# Patient Record
Sex: MALE | Race: WHITE | NOT HISPANIC OR LATINO | Employment: OTHER | URBAN - METROPOLITAN AREA
[De-identification: names, ages, dates, MRNs, and addresses within clinical notes are randomized per-mention and may not be internally consistent; named-entity substitution may affect disease eponyms.]

---

## 2022-02-04 ENCOUNTER — TELEPHONE (OUTPATIENT)
Dept: SCHEDULING | Facility: IMAGING CENTER | Age: 77
End: 2022-02-04

## 2022-02-04 SDOH — ECONOMIC STABILITY: FOOD INSECURITY: WITHIN THE PAST 12 MONTHS, YOU WORRIED THAT YOUR FOOD WOULD RUN OUT BEFORE YOU GOT MONEY TO BUY MORE.: NEVER TRUE

## 2022-02-04 SDOH — HEALTH STABILITY: PHYSICAL HEALTH: ON AVERAGE, HOW MANY MINUTES DO YOU ENGAGE IN EXERCISE AT THIS LEVEL?: 20 MIN

## 2022-02-04 SDOH — HEALTH STABILITY: PHYSICAL HEALTH: ON AVERAGE, HOW MANY DAYS PER WEEK DO YOU ENGAGE IN MODERATE TO STRENUOUS EXERCISE (LIKE A BRISK WALK)?: 5 DAYS

## 2022-02-04 SDOH — ECONOMIC STABILITY: HOUSING INSECURITY
IN THE LAST 12 MONTHS, WAS THERE A TIME WHEN YOU DID NOT HAVE A STEADY PLACE TO SLEEP OR SLEPT IN A SHELTER (INCLUDING NOW)?: NO

## 2022-02-04 SDOH — ECONOMIC STABILITY: FOOD INSECURITY: WITHIN THE PAST 12 MONTHS, THE FOOD YOU BOUGHT JUST DIDN'T LAST AND YOU DIDN'T HAVE MONEY TO GET MORE.: NEVER TRUE

## 2022-02-04 SDOH — ECONOMIC STABILITY: INCOME INSECURITY: IN THE LAST 12 MONTHS, WAS THERE A TIME WHEN YOU WERE NOT ABLE TO PAY THE MORTGAGE OR RENT ON TIME?: NO

## 2022-02-04 SDOH — HEALTH STABILITY: MENTAL HEALTH
STRESS IS WHEN SOMEONE FEELS TENSE, NERVOUS, ANXIOUS, OR CAN'T SLEEP AT NIGHT BECAUSE THEIR MIND IS TROUBLED. HOW STRESSED ARE YOU?: NOT AT ALL

## 2022-02-04 SDOH — ECONOMIC STABILITY: TRANSPORTATION INSECURITY
IN THE PAST 12 MONTHS, HAS THE LACK OF TRANSPORTATION KEPT YOU FROM MEDICAL APPOINTMENTS OR FROM GETTING MEDICATIONS?: NO

## 2022-02-04 SDOH — ECONOMIC STABILITY: TRANSPORTATION INSECURITY
IN THE PAST 12 MONTHS, HAS LACK OF TRANSPORTATION KEPT YOU FROM MEETINGS, WORK, OR FROM GETTING THINGS NEEDED FOR DAILY LIVING?: NO

## 2022-02-04 SDOH — ECONOMIC STABILITY: TRANSPORTATION INSECURITY
IN THE PAST 12 MONTHS, HAS LACK OF RELIABLE TRANSPORTATION KEPT YOU FROM MEDICAL APPOINTMENTS, MEETINGS, WORK OR FROM GETTING THINGS NEEDED FOR DAILY LIVING?: NO

## 2022-02-04 SDOH — ECONOMIC STABILITY: INCOME INSECURITY: HOW HARD IS IT FOR YOU TO PAY FOR THE VERY BASICS LIKE FOOD, HOUSING, MEDICAL CARE, AND HEATING?: NOT HARD AT ALL

## 2022-02-04 SDOH — ECONOMIC STABILITY: HOUSING INSECURITY

## 2022-02-04 ASSESSMENT — LIFESTYLE VARIABLES
HOW OFTEN DO YOU HAVE A DRINK CONTAINING ALCOHOL: 4 OR MORE TIMES A WEEK
HOW OFTEN DO YOU HAVE SIX OR MORE DRINKS ON ONE OCCASION: NEVER
HOW MANY STANDARD DRINKS CONTAINING ALCOHOL DO YOU HAVE ON A TYPICAL DAY: 1 OR 2

## 2022-02-04 ASSESSMENT — SOCIAL DETERMINANTS OF HEALTH (SDOH)
IN A TYPICAL WEEK, HOW MANY TIMES DO YOU TALK ON THE PHONE WITH FAMILY, FRIENDS, OR NEIGHBORS?: ONCE A WEEK
HOW OFTEN DO YOU ATTEND CHURCH OR RELIGIOUS SERVICES?: NEVER
HOW HARD IS IT FOR YOU TO PAY FOR THE VERY BASICS LIKE FOOD, HOUSING, MEDICAL CARE, AND HEATING?: NOT HARD AT ALL
DO YOU BELONG TO ANY CLUBS OR ORGANIZATIONS SUCH AS CHURCH GROUPS UNIONS, FRATERNAL OR ATHLETIC GROUPS, OR SCHOOL GROUPS?: YES
HOW OFTEN DO YOU ATTENT MEETINGS OF THE CLUB OR ORGANIZATION YOU BELONG TO?: MORE THAN 4 TIMES PER YEAR
HOW OFTEN DO YOU HAVE SIX OR MORE DRINKS ON ONE OCCASION: NEVER
HOW OFTEN DO YOU HAVE A DRINK CONTAINING ALCOHOL: 4 OR MORE TIMES A WEEK
HOW OFTEN DO YOU GET TOGETHER WITH FRIENDS OR RELATIVES?: ONCE A WEEK
HOW MANY DRINKS CONTAINING ALCOHOL DO YOU HAVE ON A TYPICAL DAY WHEN YOU ARE DRINKING: 1 OR 2
HOW OFTEN DO YOU ATTENT MEETINGS OF THE CLUB OR ORGANIZATION YOU BELONG TO?: MORE THAN 4 TIMES PER YEAR
DO YOU BELONG TO ANY CLUBS OR ORGANIZATIONS SUCH AS CHURCH GROUPS UNIONS, FRATERNAL OR ATHLETIC GROUPS, OR SCHOOL GROUPS?: YES
HOW OFTEN DO YOU ATTEND CHURCH OR RELIGIOUS SERVICES?: NEVER
WITHIN THE PAST 12 MONTHS, YOU WORRIED THAT YOUR FOOD WOULD RUN OUT BEFORE YOU GOT THE MONEY TO BUY MORE: NEVER TRUE
HOW OFTEN DO YOU GET TOGETHER WITH FRIENDS OR RELATIVES?: ONCE A WEEK
IN A TYPICAL WEEK, HOW MANY TIMES DO YOU TALK ON THE PHONE WITH FAMILY, FRIENDS, OR NEIGHBORS?: ONCE A WEEK

## 2022-02-07 ENCOUNTER — OFFICE VISIT (OUTPATIENT)
Dept: INTERNAL MEDICINE | Facility: OTHER | Age: 77
End: 2022-02-07
Payer: MEDICARE

## 2022-02-07 VITALS
SYSTOLIC BLOOD PRESSURE: 160 MMHG | TEMPERATURE: 97.2 F | WEIGHT: 208.8 LBS | DIASTOLIC BLOOD PRESSURE: 85 MMHG | OXYGEN SATURATION: 95 % | HEIGHT: 73 IN | BODY MASS INDEX: 27.67 KG/M2 | HEART RATE: 60 BPM

## 2022-02-07 DIAGNOSIS — Z90.79 H/O PROSTATECTOMY: ICD-10-CM

## 2022-02-07 DIAGNOSIS — Z00.00 HEALTH CARE MAINTENANCE: ICD-10-CM

## 2022-02-07 DIAGNOSIS — R41.3 MEMORY LOSS: ICD-10-CM

## 2022-02-07 DIAGNOSIS — N52.9 ERECTILE DYSFUNCTION, UNSPECIFIED ERECTILE DYSFUNCTION TYPE: ICD-10-CM

## 2022-02-07 DIAGNOSIS — R73.03 PRE-DIABETES: ICD-10-CM

## 2022-02-07 DIAGNOSIS — E55.9 VITAMIN D DEFICIENCY: ICD-10-CM

## 2022-02-07 DIAGNOSIS — E78.5 DYSLIPIDEMIA: ICD-10-CM

## 2022-02-07 DIAGNOSIS — R03.0 ELEVATED BLOOD PRESSURE READING: ICD-10-CM

## 2022-02-07 PROCEDURE — 99214 OFFICE O/P EST MOD 30 MIN: CPT | Mod: GC | Performed by: STUDENT IN AN ORGANIZED HEALTH CARE EDUCATION/TRAINING PROGRAM

## 2022-02-07 RX ORDER — ATORVASTATIN CALCIUM 20 MG/1
40 TABLET, FILM COATED ORAL DAILY
COMMUNITY
Start: 2022-02-04 | End: 2022-03-17 | Stop reason: SDUPTHER

## 2022-02-07 ASSESSMENT — PATIENT HEALTH QUESTIONNAIRE - PHQ9: CLINICAL INTERPRETATION OF PHQ2 SCORE: 0

## 2022-02-07 NOTE — PROGRESS NOTES
Established Patient    Chief Complaint   Patient presents with   • New Patient   • Memory Loss       HPI: Patricio Huffman is a 76 y.o. male presented to the clinic to establish care.    He moved from [place] to [place] about 2 years ago and lives in [place] but was following with his previous PCP via telemedicine and would like to establish with provider in [place].     Dyslipidemia - Currently on Atorvastatin 20mg for primary prevention. Labs from 5/2021 showing TC-188, TG-88, HDL-46 and LDL-124. Reports having high cholesterol since age 36. Denies any history of MI, Stroke or PVD. Denies any myalgias 2/2 to statin therapy.    Pre-diabetes/Diabetes - Unclear history but informs his previous PCP has started Metformin 500mg once daily about a year ago and that it would also help with weight loss. Last A1c from his previous labs was 5.4% at 10/2020. Denies any neuropathy symptoms.    Elevated blood pressure - Reports getting anxious while in medical setting since age of 21's. Check blood pressure once every 3 months with readings about 130's/80's. Denies any symptoms of chest pain, shortness of breath, headache, palpitations.    Prostate Cancer - Underwent prostatectomy around 12 years ago and following with Dr. Oconnor(urology) from [place]. Did not receive any chemo/radiation. He would like to continue to follow with same urologist.    Erectile dysfunction - Currently taking Trimex, self administered as needed prescribed by his urologist.        Patient Active Problem List    Diagnosis Date Noted   • Dyslipidemia 02/09/2022   • H/O prostatectomy 02/09/2022   • Elevated blood pressure reading 02/09/2022   • Erectile dysfunction 02/09/2022   • Pre-diabetes 02/09/2022   • Memory loss 02/09/2022   • Health care maintenance 02/09/2022       Current Outpatient Medications   Medication Sig Dispense Refill   • atorvastatin (LIPITOR) 20 MG Tab Take 20 mg by mouth every day.     • metFORMIN (GLUCOPHAGE) 500 MG  "Tab Take 500 mg by mouth every day.       No current facility-administered medications for this visit.       ROS: As per HPI. Additional pertinent systems as noted below.  Constitutional:  Negative for fever, chills   HENT:  Negative for ear pain, sore throat, runny nose   Eyes:  Negative for blurred vision and double vision   Cardiovascular:  Negative for chest pain, palpitations   Respiratory:  Negative for cough, sputum production, shortness of breath   Gastrointestinal: Negative for abdominal pain, nausea, vomiting, diarrhea, or blood in stools   Genitourinary: Negative for dysuria, flank pain and hematuria  Skin: Negative for rash, itching  Extremities: Negative for leg swelling     /85 (BP Location: Right arm, Patient Position: Sitting, BP Cuff Size: Adult)   Pulse 60   Temp 36.2 °C (97.2 °F) (Temporal)   Ht 1.854 m (6' 1\")   Wt 94.7 kg (208 lb 12.8 oz)   SpO2 95%   BMI 27.55 kg/m²     Physical Exam   Constitutional:  Well developed, well nourished. Not in acute distress   HENT:  Normocephalic, Atraumatic, Oropharynx is pink and moist. No oral exudates, Nose normal   Eyes:  EOMI, Conjunctiva normal, No discharge. PERRLA   Neck:  Normal range of motion, No cervical lymphadenopathy. No thyromegaly.   Cardiovascular:  Regular rate and rhythm, No pedal edema, Intact distal pulses   Respiratory: Clear to auscultation bilaterally, No use of accessory muscles   Gastrointestinal: Bowel sounds normal, Soft, No tenderness, No palpable masses/hepatosplenomegaly   Musculoskeletal: No cyanosis or clubbing, normal ROM     Note: I have reviewed all pertinent labs and diagnostic tests associated with this visit with specific comments listed under the assessment and plan below    Assessment and Plan  H/O prostatectomy  Underwent prostatectomy around 12 years ago and following with Dr. Oconnor(urology) from [place]. Did not receive any chemo/radiation. He would like to continue to follow with same " urologist.  Plan:  - Continue urology followup  - Check PSA annually for surveillance as determined by urology      Elevated blood pressure reading  Reports getting anxious while in medical setting since age of 21's. Check blood pressure once every 3 months with readings about 130's/80's.   BP today in clinic - 160/85  Plan:  - Ambulatory blood pressure monitoring to rule out white coat hypertension  - DASH diet/exericise and weight loss  - Followup in 4 weeks with BP log along with BP machine    Erectile dysfunction  Currently taking Trimex, self administered as needed prescribed by his urologist    Pre-diabetes  Unclear history but informs his previous PCP has started Metformin 500mg once daily about a year ago and that it would also help with weight loss. Last A1c from his previous labs was 5.4% at 10/2020. Denies any neuropathy symptoms.  Plan:  - Check A1c and determine if he needs to continue Metformin based on lab results at next visit      Memory loss  Self reported complaints of problems with memory once in a while  Difficulty with short term memory and in general has problem with names/numbers  Independent with ADL's/IADL's  MOCA score of 29 with a point missed for delayed recall  Plan:  - Low suspicion for any ongoing process such as dementia and believe it to be sec. to aging  -  Check for reversible risk factors such as B12 and TSH  - Hold off on any imaging such as MRI brain at this time or neurology referral  - Follow up at next visit    Health care maintenance  Received 3 doses of Moderna vaccine  Colonoscopy - Informs getting it done about 7 years with normal result  Shingles - 2 years ago  PPSV 23 - 2 years ago  Plan:  - Followup on Tdap and Flu vaccine at next visit        Followup: No follow-ups on file.    Patient seen with attending.    Signed by: Hattie Hsu M.D.    Please note that this dictation was created using voice recognition software. I have made every reasonable attempt to  correct obvious errors, but I expect that there are errors of grammar and possibly content that I did not discover before finalizing the note.

## 2022-02-07 NOTE — PATIENT INSTRUCTIONS
"Please get blood work printed prior to your next appointment    Elevated Blood pressure - Please exercise, watch your diet, Maintain BP log and bring BP machine to your next visit.    Follow up in 4-6 weeks with me      DASH Eating Plan  DASH stands for \"Dietary Approaches to Stop Hypertension.\" The DASH eating plan is a healthy eating plan that has been shown to reduce high blood pressure (hypertension). It may also reduce your risk for type 2 diabetes, heart disease, and stroke. The DASH eating plan may also help with weight loss.  What are tips for following this plan?    General guidelines  · Avoid eating more than 2,300 mg (milligrams) of salt (sodium) a day. If you have hypertension, you may need to reduce your sodium intake to 1,500 mg a day.  · Limit alcohol intake to no more than 1 drink a day for nonpregnant women and 2 drinks a day for men. One drink equals 12 oz of beer, 5 oz of wine, or 1½ oz of hard liquor.  · Work with your health care provider to maintain a healthy body weight or to lose weight. Ask what an ideal weight is for you.  · Get at least 30 minutes of exercise that causes your heart to beat faster (aerobic exercise) most days of the week. Activities may include walking, swimming, or biking.  · Work with your health care provider or diet and nutrition specialist (dietitian) to adjust your eating plan to your individual calorie needs.  Reading food labels    · Check food labels for the amount of sodium per serving. Choose foods with less than 5 percent of the Daily Value of sodium. Generally, foods with less than 300 mg of sodium per serving fit into this eating plan.  · To find whole grains, look for the word \"whole\" as the first word in the ingredient list.  Shopping  · Buy products labeled as \"low-sodium\" or \"no salt added.\"  · Buy fresh foods. Avoid canned foods and premade or frozen meals.  Cooking  · Avoid adding salt when cooking. Use salt-free seasonings or herbs instead of table salt " or sea salt. Check with your health care provider or pharmacist before using salt substitutes.  · Do not sousa foods. Cook foods using healthy methods such as baking, boiling, grilling, and broiling instead.  · Cook with heart-healthy oils, such as olive, canola, soybean, or sunflower oil.  Meal planning  · Eat a balanced diet that includes:  ? 5 or more servings of fruits and vegetables each day. At each meal, try to fill half of your plate with fruits and vegetables.  ? Up to 6-8 servings of whole grains each day.  ? Less than 6 oz of lean meat, poultry, or fish each day. A 3-oz serving of meat is about the same size as a deck of cards. One egg equals 1 oz.  ? 2 servings of low-fat dairy each day.  ? A serving of nuts, seeds, or beans 5 times each week.  ? Heart-healthy fats. Healthy fats called Omega-3 fatty acids are found in foods such as flaxseeds and coldwater fish, like sardines, salmon, and mackerel.  · Limit how much you eat of the following:  ? Canned or prepackaged foods.  ? Food that is high in trans fat, such as fried foods.  ? Food that is high in saturated fat, such as fatty meat.  ? Sweets, desserts, sugary drinks, and other foods with added sugar.  ? Full-fat dairy products.  · Do not salt foods before eating.  · Try to eat at least 2 vegetarian meals each week.  · Eat more home-cooked food and less restaurant, buffet, and fast food.  · When eating at a restaurant, ask that your food be prepared with less salt or no salt, if possible.  What foods are recommended?  The items listed may not be a complete list. Talk with your dietitian about what dietary choices are best for you.  Grains  Whole-grain or whole-wheat bread. Whole-grain or whole-wheat pasta. Brown rice. Oatmeal. Quinoa. Bulgur. Whole-grain and low-sodium cereals. Antoinette bread. Low-fat, low-sodium crackers. Whole-wheat flour tortillas.  Vegetables  Fresh or frozen vegetables (raw, steamed, roasted, or grilled). Low-sodium or reduced-sodium  tomato and vegetable juice. Low-sodium or reduced-sodium tomato sauce and tomato paste. Low-sodium or reduced-sodium canned vegetables.  Fruits  All fresh, dried, or frozen fruit. Canned fruit in natural juice (without added sugar).  Meat and other protein foods  Skinless chicken or turkey. Ground chicken or turkey. Pork with fat trimmed off. Fish and seafood. Egg whites. Dried beans, peas, or lentils. Unsalted nuts, nut butters, and seeds. Unsalted canned beans. Lean cuts of beef with fat trimmed off. Low-sodium, lean deli meat.  Dairy  Low-fat (1%) or fat-free (skim) milk. Fat-free, low-fat, or reduced-fat cheeses. Nonfat, low-sodium ricotta or cottage cheese. Low-fat or nonfat yogurt. Low-fat, low-sodium cheese.  Fats and oils  Soft margarine without trans fats. Vegetable oil. Low-fat, reduced-fat, or light mayonnaise and salad dressings (reduced-sodium). Canola, safflower, olive, soybean, and sunflower oils. Avocado.  Seasoning and other foods  Herbs. Spices. Seasoning mixes without salt. Unsalted popcorn and pretzels. Fat-free sweets.  What foods are not recommended?  The items listed may not be a complete list. Talk with your dietitian about what dietary choices are best for you.  Grains  Baked goods made with fat, such as croissants, muffins, or some breads. Dry pasta or rice meal packs.  Vegetables  Creamed or fried vegetables. Vegetables in a cheese sauce. Regular canned vegetables (not low-sodium or reduced-sodium). Regular canned tomato sauce and paste (not low-sodium or reduced-sodium). Regular tomato and vegetable juice (not low-sodium or reduced-sodium). Pickles. Olives.  Fruits  Canned fruit in a light or heavy syrup. Fried fruit. Fruit in cream or butter sauce.  Meat and other protein foods  Fatty cuts of meat. Ribs. Fried meat. Medrano. Sausage. Bologna and other processed lunch meats. Salami. Fatback. Hotdogs. Bratwurst. Salted nuts and seeds. Canned beans with added salt. Canned or smoked fish.  Whole eggs or egg yolks. Chicken or turkey with skin.  Dairy  Whole or 2% milk, cream, and half-and-half. Whole or full-fat cream cheese. Whole-fat or sweetened yogurt. Full-fat cheese. Nondairy creamers. Whipped toppings. Processed cheese and cheese spreads.  Fats and oils  Butter. Stick margarine. Lard. Shortening. Ghee. Medrano fat. Tropical oils, such as coconut, palm kernel, or palm oil.  Seasoning and other foods  Salted popcorn and pretzels. Onion salt, garlic salt, seasoned salt, table salt, and sea salt. Worcestershire sauce. Tartar sauce. Barbecue sauce. Teriyaki sauce. Soy sauce, including reduced-sodium. Steak sauce. Canned and packaged gravies. Fish sauce. Oyster sauce. Cocktail sauce. Horseradish that you find on the shelf. Ketchup. Mustard. Meat flavorings and tenderizers. Bouillon cubes. Hot sauce and Tabasco sauce. Premade or packaged marinades. Premade or packaged taco seasonings. Relishes. Regular salad dressings.  Where to find more information:  · National Heart, Lung, and Blood [place]: www.nhlbi.nih.gov  · American Heart Association: www.heart.org  Summary  · The DASH eating plan is a healthy eating plan that has been shown to reduce high blood pressure (hypertension). It may also reduce your risk for type 2 diabetes, heart disease, and stroke.  · With the DASH eating plan, you should limit salt (sodium) intake to 2,300 mg a day. If you have hypertension, you may need to reduce your sodium intake to 1,500 mg a day.  · When on the DASH eating plan, aim to eat more fresh fruits and vegetables, whole grains, lean proteins, low-fat dairy, and heart-healthy fats.  · Work with your health care provider or diet and nutrition specialist (dietitian) to adjust your eating plan to your individual calorie needs.  This information is not intended to replace advice given to you by your health care provider. Make sure you discuss any questions you have with your health care provider.  Document Released:  12/06/2012 Document Revised: 11/30/2018 Document Reviewed: 12/11/2017  Elsevier Patient Education © 2020 [employer] Inc.

## 2022-02-09 PROBLEM — R41.3 MEMORY LOSS: Status: ACTIVE | Noted: 2022-02-09

## 2022-02-09 PROBLEM — R03.0 ELEVATED BLOOD PRESSURE READING: Status: ACTIVE | Noted: 2022-02-09

## 2022-02-09 PROBLEM — E78.5 DYSLIPIDEMIA: Status: ACTIVE | Noted: 2022-02-09

## 2022-02-09 PROBLEM — N52.9 ERECTILE DYSFUNCTION: Status: ACTIVE | Noted: 2022-02-09

## 2022-02-09 PROBLEM — Z90.79 H/O PROSTATECTOMY: Status: ACTIVE | Noted: 2022-02-09

## 2022-02-09 PROBLEM — Z00.00 HEALTH CARE MAINTENANCE: Status: ACTIVE | Noted: 2022-02-09

## 2022-02-09 PROBLEM — R73.03 PRE-DIABETES: Status: ACTIVE | Noted: 2022-02-09

## 2022-02-10 NOTE — ASSESSMENT & PLAN NOTE
Reports getting anxious while in medical setting since age of 21's. Check blood pressure once every 3 months with readings about 130's/80's.   BP today in clinic - 160/85  Plan:  - Ambulatory blood pressure monitoring to rule out white coat hypertension  - DASH diet/exericise and weight loss  - Followup in 4 weeks with BP log along with BP machine

## 2022-02-10 NOTE — ASSESSMENT & PLAN NOTE
Unclear history but informs his previous PCP has started Metformin 500mg once daily about a year ago and that it would also help with weight loss. Last A1c from his previous labs was 5.4% at 10/2020. Denies any neuropathy symptoms.  Plan:  - Check A1c and determine if he needs to continue Metformin based on lab results at next visit

## 2022-02-10 NOTE — ASSESSMENT & PLAN NOTE
Received 3 doses of Moderna vaccine  Colonoscopy - Informs getting it done about 7 years with normal result  Shingles - 2 years ago  PPSV 23 - 2 years ago  Plan:  - Followup on Tdap and Flu vaccine at next visit

## 2022-02-10 NOTE — ASSESSMENT & PLAN NOTE
Self reported complaints of problems with memory once in a while  Difficulty with short term memory and in general has problem with names/numbers  Independent with ADL's/IADL's  MOCA score of 29 with a point missed for delayed recall  Plan:  - Low suspicion for any ongoing process such as dementia and believe it to be sec. to aging  -  Check for reversible risk factors such as B12 and TSH  - Hold off on any imaging such as MRI brain at this time or neurology referral  - Follow up at next visit

## 2022-02-10 NOTE — ASSESSMENT & PLAN NOTE
Underwent prostatectomy around 12 years ago and following with Dr. Oconnor(urology) from [place]. Did not receive any chemo/radiation. He would like to continue to follow with same urologist.  Plan:  - Continue urology followup  - Check PSA annually for surveillance as determined by urology

## 2022-03-17 ENCOUNTER — OFFICE VISIT (OUTPATIENT)
Dept: INTERNAL MEDICINE | Facility: OTHER | Age: 77
End: 2022-03-17
Payer: MEDICARE

## 2022-03-17 VITALS
HEIGHT: 73 IN | SYSTOLIC BLOOD PRESSURE: 142 MMHG | TEMPERATURE: 98.1 F | DIASTOLIC BLOOD PRESSURE: 77 MMHG | HEART RATE: 68 BPM | WEIGHT: 200 LBS | BODY MASS INDEX: 26.51 KG/M2 | OXYGEN SATURATION: 95 %

## 2022-03-17 DIAGNOSIS — E78.5 DYSLIPIDEMIA: ICD-10-CM

## 2022-03-17 DIAGNOSIS — Z00.00 HEALTH CARE MAINTENANCE: ICD-10-CM

## 2022-03-17 DIAGNOSIS — R73.03 PRE-DIABETES: ICD-10-CM

## 2022-03-17 DIAGNOSIS — R03.0 ELEVATED BLOOD PRESSURE READING: ICD-10-CM

## 2022-03-17 LAB
HBA1C MFR BLD: 5.5 % (ref 0–5.6)
INT CON NEG: NEGATIVE
INT CON POS: POSITIVE

## 2022-03-17 PROCEDURE — 99213 OFFICE O/P EST LOW 20 MIN: CPT | Mod: GE | Performed by: STUDENT IN AN ORGANIZED HEALTH CARE EDUCATION/TRAINING PROGRAM

## 2022-03-17 PROCEDURE — 83036 HEMOGLOBIN GLYCOSYLATED A1C: CPT | Mod: GC | Performed by: STUDENT IN AN ORGANIZED HEALTH CARE EDUCATION/TRAINING PROGRAM

## 2022-03-17 RX ORDER — ATORVASTATIN CALCIUM 20 MG/1
40 TABLET, FILM COATED ORAL DAILY
Qty: 180 TABLET | Refills: 1 | Status: SHIPPED | OUTPATIENT
Start: 2022-03-17 | End: 2022-03-24 | Stop reason: SDUPTHER

## 2022-03-17 NOTE — PATIENT INSTRUCTIONS
Please stop taking Metformin.    Elevated blood pressure - We will continue to monitor your blood pressure and hold off on starting any medications. Believe it is secondary to white coat hypertension.    Dyslipidemia - We will go up on the dosage of atorvastatin to 40mg    Stomach discomfort - Try Tums over the counter.    Get Tdap(tetanus) vaccine from the pharmacy    Re-establish in July

## 2022-03-17 NOTE — PROGRESS NOTES
Established Patient    Chief Complaint   Patient presents with   • Hyperlipidemia     Follow up, labs       HPI: Patricio Huffman is a 77 y.o. male with past medical history significant for Dyslipidemia, pre-diabetes, elevated blood pressure readings, history of prostatectomy presents to the clinic for follow up visit.      Elevated Blood pressure - During last clinic visit his BP was 160/85 and he was asked to maintain BP log which showed home BP readings of 120-130/70's. He reports history of white coat hypertension, getting anxious in the presence of medical provider. He was never on any blood pressure medications before. BP today in the clinic at 142/77. Denies any chest pain, shortness of breath, headache or vision changes.    He had lab work done in 2/2022 which showed normal TSH/B12/Vitamin D and PSA of zero. Lipid panel was significant for TC-184, TG- 140, HDl-47 and LDL - 109. He is currently on Atorvastatin 20mg for primary revention and tolerating it well without any side affects such as myalgias. No known prior history of stroke, MI or PVD.      Patient Active Problem List    Diagnosis Date Noted   • Dyslipidemia 02/09/2022   • H/O prostatectomy 02/09/2022   • Elevated blood pressure reading 02/09/2022   • Erectile dysfunction 02/09/2022   • Pre-diabetes 02/09/2022   • Memory loss 02/09/2022   • Health care maintenance 02/09/2022       Current Outpatient Medications   Medication Sig Dispense Refill   • atorvastatin (LIPITOR) 20 MG Tab Take 2 Tablets by mouth every day. 180 Tablet 1     No current facility-administered medications for this visit.       ROS: As per HPI. Additional pertinent systems as noted below.  Constitutional:  Negative for fever, chills   HENT:  Negative for ear pain, sore throat, runny nose   Eyes:  Negative for blurred vision and double vision   Cardiovascular:  Negative for chest pain, palpitations   Respiratory:  Negative for cough, sputum production, shortness of breath  "  Gastrointestinal: Negative for abdominal pain, nausea, vomiting, diarrhea, or blood in stools   Genitourinary: Negative for dysuria, flank pain and hematuria  Skin: Negative for rash, itching  Extremities: Negative for leg swelling       /77 (BP Location: Left arm, Patient Position: Sitting, BP Cuff Size: Adult)   Pulse 68   Temp 36.7 °C (98.1 °F) (Temporal)   Ht 1.854 m (6' 1\")   Wt 90.7 kg (200 lb)   SpO2 95%   BMI 26.39 kg/m²     Physical Exam   Constitutional:  Well developed, well nourished. Not in acute distress   HENT:  Normocephalic, Atraumatic, Oropharynx is pink and moist. No oral exudates, Nose normal   Eyes:  EOMI, Conjunctiva normal, No discharge. PERRLA   Neck:  Normal range of motion, No cervical lymphadenopathy. No thyromegaly.   Cardiovascular:  Regular rate and rhythm, No pedal edema, Intact distal pulses   Respiratory: Clear to auscultation bilaterally, No use of accessory muscles   Gastrointestinal: Bowel sounds normal, Soft, No tenderness, No palpable masses/hepatosplenomegaly       Note: I have reviewed all pertinent labs and diagnostic tests associated with this visit with specific comments listed under the assessment and plan below    Assessment and Plan  Elevated blood pressure reading  BP improved in clinic compared to last visit at 142/77. At previous visit it was 160/85.  Home BP log showing readings at 120-130/70's  Elevated BP readings likely secondary to underlying component of white coat hypertension  Plan:  - Continue to monitor BP for now and hold off on initiating any medications  - DASH diet, exercise and weight loss      Dyslipidemia  Lipid panel showing  TC-184, TG- 140, HDl-47 and LDL - 109.  Currently on atorvastatin 20mg for primary prevention and tolerating well without side affects  ASCVD risk score of 16.8%  Plan:  - Increase atorvastatin to 40mg    Pre-diabetes  A1c done today in clinic at 5.5%  Prior Last known A1c of 5.4% from 10/2020  Informs being " started on metformin 500mg once daily by his previous PCP for ?pre-diabetes or to help with weight loss  Plan:  - Based on previous two labs, he doesn't warrant treatment with metformin and also cannot be classified as a pre-diabetic    Health care maintenance  Received 3 doses of Moderna vaccine  Colonoscopy - Informs getting it done about 7 years ago with normal result  Shingles - 2 years ago  PPSV 23 - 2 years ago  Flu shot - 11/2021  Last Tetanus shot unclear, advised to get Tdap vaccine from nearby pharmacy      Followup: No follow-ups on file.    Patient discussed with attending.    Signed by: Hattie Hsu M.D.    Please note that this dictation was created using voice recognition software. I have made every reasonable attempt to correct obvious errors, but I expect that there are errors of grammar and possibly content that I did not discover before finalizing the note.

## 2022-03-20 PROBLEM — R73.03 PRE-DIABETES: Status: RESOLVED | Noted: 2022-02-09 | Resolved: 2022-03-20

## 2022-03-21 NOTE — ASSESSMENT & PLAN NOTE
BP improved in clinic compared to last visit at 142/77. At previous visit it was 160/85.  Home BP log showing readings at 120-130/70's  Elevated BP readings likely secondary to underlying component of white coat hypertension  Plan:  - Continue to monitor BP for now and hold off on initiating any medications  - DASH diet, exercise and weight loss

## 2022-03-21 NOTE — ASSESSMENT & PLAN NOTE
Received 3 doses of Moderna vaccine  Colonoscopy - Informs getting it done about 7 years ago with normal result  Shingles - 2 years ago  PPSV 23 - 2 years ago  Flu shot - 11/2021  Last Tetanus shot unclear, advised to get Tdap vaccine from nearby pharmacy

## 2022-03-21 NOTE — ASSESSMENT & PLAN NOTE
A1c done today in clinic at 5.5%  Prior Last known A1c of 5.4% from 10/2020  Informs being started on metformin 500mg once daily by his previous PCP for ?pre-diabetes or to help with weight loss  Plan:  - Based on previous two labs, he doesn't warrant treatment with metformin and also cannot be classified as a pre-diabetic

## 2022-03-21 NOTE — ASSESSMENT & PLAN NOTE
Lipid panel showing  TC-184, TG- 140, HDl-47 and LDL - 109.  Currently on atorvastatin 20mg for primary prevention and tolerating well without side affects  ASCVD risk score of 16.8%  Plan:  - Increase atorvastatin to 40mg

## 2022-03-24 RX ORDER — ATORVASTATIN CALCIUM 40 MG/1
40 TABLET, FILM COATED ORAL DAILY
Qty: 90 TABLET | Refills: 1 | Status: SHIPPED
Start: 2022-03-24 | End: 2022-11-25

## 2022-03-24 NOTE — TELEPHONE ENCOUNTER
Tried calling patient to let him know prescription has been filled, but no response and unable to leave a message. Patient notified via [employer]

## 2022-03-24 NOTE — TELEPHONE ENCOUNTER
Last seen: 3/17/22 by Dr. Hsu Next appt: None      Does patient have an active prescription for medications requested? No    Received Request Via: Patient's insurance will not cover express script. Patient requesting to have prescription sent to [facility]

## 2022-10-11 PROBLEM — Z00.00 HEALTH CARE MAINTENANCE: Status: RESOLVED | Noted: 2022-02-09 | Resolved: 2022-10-11

## 2022-10-14 ENCOUNTER — OFFICE VISIT (OUTPATIENT)
Dept: INTERNAL MEDICINE | Facility: OTHER | Age: 77
End: 2022-10-14
Payer: MEDICARE

## 2022-10-14 VITALS
TEMPERATURE: 97.5 F | OXYGEN SATURATION: 97 % | BODY MASS INDEX: 26.48 KG/M2 | HEIGHT: 73 IN | WEIGHT: 199.8 LBS | DIASTOLIC BLOOD PRESSURE: 70 MMHG | HEART RATE: 68 BPM | SYSTOLIC BLOOD PRESSURE: 140 MMHG

## 2022-10-14 DIAGNOSIS — R25.2 MUSCLE CRAMPS: ICD-10-CM

## 2022-10-14 DIAGNOSIS — R03.0 ELEVATED BLOOD PRESSURE READING: ICD-10-CM

## 2022-10-14 DIAGNOSIS — Z23 ENCOUNTER FOR IMMUNIZATION: ICD-10-CM

## 2022-10-14 DIAGNOSIS — E78.5 DYSLIPIDEMIA: ICD-10-CM

## 2022-10-14 DIAGNOSIS — R01.1 SYSTOLIC MURMUR: ICD-10-CM

## 2022-10-14 PROCEDURE — G0008 ADMIN INFLUENZA VIRUS VAC: HCPCS | Performed by: GENERAL PRACTICE

## 2022-10-14 PROCEDURE — 90662 IIV NO PRSV INCREASED AG IM: CPT | Performed by: GENERAL PRACTICE

## 2022-10-14 PROCEDURE — 99214 OFFICE O/P EST MOD 30 MIN: CPT | Mod: GC | Performed by: GENERAL PRACTICE

## 2022-10-14 ASSESSMENT — ENCOUNTER SYMPTOMS
WHEEZING: 0
PALPITATIONS: 0
HEADACHES: 0
DIARRHEA: 0
HEARTBURN: 0
DEPRESSION: 0
DOUBLE VISION: 0
FALLS: 0
ABDOMINAL PAIN: 0
FEVER: 0
COUGH: 0
CHILLS: 0
NAUSEA: 0
NERVOUS/ANXIOUS: 0
SHORTNESS OF BREATH: 0
WEIGHT LOSS: 0
SORE THROAT: 0
WEAKNESS: 0
DIZZINESS: 0
VOMITING: 0
BLURRED VISION: 0
CONSTIPATION: 0
MYALGIAS: 1

## 2022-10-14 ASSESSMENT — LIFESTYLE VARIABLES: SUBSTANCE_ABUSE: 0

## 2022-10-14 NOTE — PATIENT INSTRUCTIONS
-call [phone] and press option 2 to schedule Echocardiogram for the heart murmur  -get labs to check chemistry (magnesium,calcium, sodium) for leg cramps, TSH  -hold lipitor for 2 weeks and see if leg cramps improve  -check blood pressure daily and record in a log and bring to next appointment  -follow up in 5 weeks

## 2022-11-04 ENCOUNTER — PATIENT MESSAGE (OUTPATIENT)
Dept: HEALTH INFORMATION MANAGEMENT | Facility: OTHER | Age: 77
End: 2022-11-04

## 2022-11-12 ENCOUNTER — TELEPHONE (OUTPATIENT)
Dept: INTERNAL MEDICINE | Facility: OTHER | Age: 77
End: 2022-11-12
Payer: MEDICARE

## 2022-11-12 PROBLEM — I35.0 MILD AORTIC STENOSIS BY PRIOR ECHOCARDIOGRAM: Status: ACTIVE | Noted: 2022-11-12

## 2022-11-13 NOTE — TELEPHONE ENCOUNTER
Call the patient to inform him that his Echocardiogram demonstrated mild aortic valve narrowing (stenosis) and that otherwise was a normal study. Recommend repeat Echocardiogram in  three to five years.

## 2022-11-22 ENCOUNTER — OFFICE VISIT (OUTPATIENT)
Dept: INTERNAL MEDICINE | Facility: OTHER | Age: 77
End: 2022-11-22
Payer: MEDICARE

## 2022-11-22 VITALS
HEART RATE: 71 BPM | DIASTOLIC BLOOD PRESSURE: 70 MMHG | OXYGEN SATURATION: 95 % | WEIGHT: 199.4 LBS | BODY MASS INDEX: 26.43 KG/M2 | SYSTOLIC BLOOD PRESSURE: 148 MMHG | TEMPERATURE: 98.5 F | HEIGHT: 73 IN

## 2022-11-22 DIAGNOSIS — E78.5 DYSLIPIDEMIA: ICD-10-CM

## 2022-11-22 DIAGNOSIS — I35.0 MILD AORTIC STENOSIS BY PRIOR ECHOCARDIOGRAM: ICD-10-CM

## 2022-11-22 DIAGNOSIS — R03.0 ELEVATED BLOOD PRESSURE READING: ICD-10-CM

## 2022-11-22 DIAGNOSIS — R73.9 HYPERGLYCEMIA: ICD-10-CM

## 2022-11-22 PROCEDURE — 99214 OFFICE O/P EST MOD 30 MIN: CPT | Mod: GC

## 2022-11-22 RX ORDER — ATORVASTATIN CALCIUM 20 MG/1
40 TABLET, FILM COATED ORAL NIGHTLY
COMMUNITY
End: 2022-12-19 | Stop reason: SDUPTHER

## 2022-11-23 NOTE — PATIENT INSTRUCTIONS
Continue 20 mg of atorvastatin  Lipids before the next visit   If you black out, have increased shortness of breath at rest, or have chest pain, please be re-evaluated  We will continue to follow blood pressures when you come to clinic

## 2022-11-23 NOTE — PROGRESS NOTES
Date of Service:  11/22/2022    CC: Labs and ECHO results    HPI:  Patricio Huffman  is a 77 y.o. male with a PMH of dyslipidemia, elevated blood pressure, erectile dysfunction and mild aortic stenosis presents for follow up to discuss labs and ECHO results.    Patient has noted that he is not as quick as he used to be and often his wife is beating him hiking. He also notes that his balance is not what it used to be. He doesn't rock climb anymore, but feels that overall he is in pretty good shape. He does not have any tremors in his hands. He also sleeps around eight hours per night.   Balance, not as balanced as he used be  He denies any syncope, lightheadedness or dizziness.   He does have tinnitus, but usually this does not bother him.  He retired ten years ago and has been able to enjoy being outside and painting.   Patient also took his blood pressures at home for two weeks. It appears that his pressures run from 120s-130s.       Review of systems:  Review of Systems   Constitutional:  Negative for chills, fever, malaise/fatigue and weight loss.   HENT:  Positive for tinnitus. Negative for congestion and sinus pain.    Eyes: Negative.    Respiratory:  Negative for cough, hemoptysis and shortness of breath.    Cardiovascular:  Negative for chest pain, palpitations and leg swelling.   Gastrointestinal: Negative.    Genitourinary: Negative.    Musculoskeletal: Negative.    Skin: Negative.    Neurological:  Negative for dizziness, focal weakness, weakness and headaches.   Psychiatric/Behavioral: Negative.          Past Medical History:  Patient Active Problem List    Diagnosis Date Noted    Mild aortic stenosis by prior echocardiogram 11/12/2022    Dyslipidemia 02/09/2022    H/O prostatectomy 02/09/2022    Elevated blood pressure reading 02/09/2022    Erectile dysfunction 02/09/2022    Memory loss 02/09/2022       Past Surgical History:    has no past surgical history on file.    Medications:  Current Outpatient  Medications   Medication Sig Dispense Refill    atorvastatin (LIPITOR) 20 MG Tab Take 20 mg by mouth every evening.      atorvastatin (LIPITOR) 40 MG Tab Take 1 Tablet by mouth every day. 90 Tablet 1     No current facility-administered medications for this visit.       Allergies:  No Known Allergies    Family History:   family history is not on file.     Social History:    Social History     Tobacco Use    Smoking status: Never    Smokeless tobacco: Never   Vaping Use    Vaping Use: Never used   Substance Use Topics    Alcohol use: Yes     Alcohol/week: 4.2 oz     Types: 7 Glasses of wine per week    Drug use: Never     Employment: Retired  Activity Level: high  Living situation:  with wife  Recent travel:  none  Other (stressors, spirituality, exposures):  none    Physical Exam:  Vitals:    11/22/22 1525   BP: (!) 148/70   Pulse: 71   Temp:    SpO2:      Body mass index is 26.31 kg/m².  Physical Exam  Constitutional:       Appearance: He is normal weight.   HENT:      Head: Normocephalic and atraumatic.      Nose: Nose normal.      Mouth/Throat:      Mouth: Mucous membranes are moist.      Pharynx: Oropharynx is clear.   Eyes:      Extraocular Movements: Extraocular movements intact.      Pupils: Pupils are equal, round, and reactive to light.   Cardiovascular:      Rate and Rhythm: Normal rate and regular rhythm.      Pulses: Normal pulses.      Heart sounds: Murmur heard.   Systolic murmur is present.   Pulmonary:      Effort: Pulmonary effort is normal. No respiratory distress.      Breath sounds: Normal breath sounds. No stridor. No wheezing or rales.   Abdominal:      General: Abdomen is flat. Bowel sounds are normal. There is no distension.      Tenderness: There is no abdominal tenderness. There is no guarding.   Musculoskeletal:         General: Normal range of motion.      Cervical back: Normal range of motion.      Right lower leg: No edema.      Left lower leg: No edema.   Skin:     General: Skin is  warm and dry.   Neurological:      General: No focal deficit present.      Mental Status: He is alert and oriented to person, place, and time.      Motor: No weakness.      Gait: Gait normal.   Psychiatric:         Mood and Affect: Mood normal.        Labs:  T4: wnl  TSH: wnl  Mg: wnl  Glucose: 141, elevated (non-fasting lab)            Imaging:  ECHO: mild aortic stenosis, trace aortic reguritation  EF: 69%    Assessment/Plan:  Elevated blood pressure reading  Patient has had elevated blood pressure readings. Because patient's levels at home are around 10 points lower, will continue to monitor. Consider machine recalibrating.     Plan:  -Continue to follow blood pressure  -Patient is very active    Mild aortic stenosis by prior echocardiogram  Most recent ECHO showed mild aortic stenosis. No acute or warning signs. Encouraged patient to continue exercise and that being at elevation may cause some fatigue. His age may also be a factor. Explained warning signs with patient to ensure that if has syncope, increased lightheadedness or dizziness that he presents to the ED.     Plan:  -Will continue to follow    Dyslipidemia  Patient had some muscle pain from statin use. Has decreased use to 20 mg. We will test his lipids to see if it is therapeutic. We will consider alternative type of statin or other type of dosing regimen.            All imaging results and lab results and consult notes are reviewed at this visit.  Followup: Return in about 3 months (around 2/22/2023).    Ragini Kilpatrick MD  Internal Medicine PGY-2

## 2022-11-25 ASSESSMENT — ENCOUNTER SYMPTOMS
SINUS PAIN: 0
FEVER: 0
EYES NEGATIVE: 1
HEADACHES: 0
DIZZINESS: 0
WEAKNESS: 0
COUGH: 0
GASTROINTESTINAL NEGATIVE: 1
WEIGHT LOSS: 0
MUSCULOSKELETAL NEGATIVE: 1
HEMOPTYSIS: 0
PALPITATIONS: 0
FOCAL WEAKNESS: 0
CHILLS: 0
PSYCHIATRIC NEGATIVE: 1
SHORTNESS OF BREATH: 0

## 2022-11-25 NOTE — ASSESSMENT & PLAN NOTE
Patient has had elevated blood pressure readings. Because patient's levels at home are around 10 points lower, will continue to monitor. Consider machine recalibrating.     Plan:  -Continue to follow blood pressure  -Patient is very active

## 2022-11-25 NOTE — ASSESSMENT & PLAN NOTE
Patient had some muscle pain from statin use. Has decreased use to 20 mg. We will test his lipids to see if it is therapeutic. We will consider alternative type of statin or other type of dosing regimen.

## 2022-11-25 NOTE — ASSESSMENT & PLAN NOTE
Most recent ECHO showed mild aortic stenosis. No acute or warning signs. Encouraged patient to continue exercise and that being at elevation may cause some fatigue. His age may also be a factor. Explained warning signs with patient to ensure that if has syncope, increased lightheadedness or dizziness that he presents to the ED.     Plan:  -Will continue to follow

## 2022-12-19 NOTE — TELEPHONE ENCOUNTER
Received request via: Pharmacy    Was the patient seen in the last year in this department? Yes  last seen: 11/22/2022 Dr ERIKA Kilpatrick  next:02/17/2023 Dr TIMMY Kilpatrick    Does the patient have an active prescription (recently filled or refills available) for medication(s) requested?  yes    Does the patient have [facility] and need 100 day supply (blood pressure, diabetes and cholesterol meds only)? Patient does not have SCP

## 2022-12-22 RX ORDER — ATORVASTATIN CALCIUM 20 MG/1
40 TABLET, FILM COATED ORAL NIGHTLY
Qty: 90 TABLET | Refills: 3 | Status: SHIPPED
Start: 2022-12-22 | End: 2022-12-28

## 2022-12-27 ENCOUNTER — TELEPHONE (OUTPATIENT)
Dept: INTERNAL MEDICINE | Facility: OTHER | Age: 77
End: 2022-12-27
Payer: MEDICARE

## 2022-12-27 NOTE — TELEPHONE ENCOUNTER
Pharmacy sent over fax stating insurance will not cover for 2 tablets daily, please change rx to 40mg tabs 1QD

## 2022-12-28 RX ORDER — ATORVASTATIN CALCIUM 40 MG/1
40 TABLET, FILM COATED ORAL NIGHTLY
Qty: 30 TABLET | Refills: 6 | Status: SHIPPED | OUTPATIENT
Start: 2022-12-28 | End: 2023-04-19 | Stop reason: SDUPTHER

## 2023-02-17 ENCOUNTER — OFFICE VISIT (OUTPATIENT)
Dept: INTERNAL MEDICINE | Facility: OTHER | Age: 78
End: 2023-02-17
Payer: MEDICARE

## 2023-02-17 VITALS
DIASTOLIC BLOOD PRESSURE: 73 MMHG | HEIGHT: 73 IN | HEART RATE: 69 BPM | BODY MASS INDEX: 27.41 KG/M2 | OXYGEN SATURATION: 96 % | TEMPERATURE: 98.4 F | WEIGHT: 206.8 LBS | SYSTOLIC BLOOD PRESSURE: 157 MMHG

## 2023-02-17 DIAGNOSIS — Z13.9 SCREENING DUE: ICD-10-CM

## 2023-02-17 DIAGNOSIS — R03.0 ELEVATED BLOOD PRESSURE READING: ICD-10-CM

## 2023-02-17 DIAGNOSIS — E78.5 DYSLIPIDEMIA: ICD-10-CM

## 2023-02-17 DIAGNOSIS — F51.02 ADJUSTMENT INSOMNIA: ICD-10-CM

## 2023-02-17 PROCEDURE — 99213 OFFICE O/P EST LOW 20 MIN: CPT | Mod: GE

## 2023-02-17 RX ORDER — ZOLPIDEM TARTRATE 5 MG/1
5 TABLET ORAL NIGHTLY PRN
Qty: 5 TABLET | Refills: 0 | Status: SHIPPED | OUTPATIENT
Start: 2023-02-17 | End: 2023-02-22

## 2023-02-17 ASSESSMENT — ENCOUNTER SYMPTOMS
EYES NEGATIVE: 1
VOMITING: 0
NAUSEA: 0
SHORTNESS OF BREATH: 0
FEVER: 0
ABDOMINAL PAIN: 0
NERVOUS/ANXIOUS: 1
COUGH: 1
MYALGIAS: 1

## 2023-02-17 ASSESSMENT — PATIENT HEALTH QUESTIONNAIRE - PHQ9: CLINICAL INTERPRETATION OF PHQ2 SCORE: 0

## 2023-02-17 NOTE — PROGRESS NOTES
Date of Service:  2/17/2023    CC: Follow up    HPI:  Patricio Huffman  is a 77 y.o. male with a PMH of dyslipidemia, elevated blood pressure in office, mild aortic stenosis as per ECHO. Patient was having increased muscular pain, he decreased his statin to 20 mg. He noticed that his muscular pain decreased when he was in [place]. He does not thing the muscle pain is related to the statin. He has been drinking tonic water and has noticed a difference in muscle pain.     Patient's blood pressure is elevated when he comes to clinic. Blood pressure documented at 110//80 at home. Will continue to follow.   He has also noticed increased coughing when swallowing with a dry throat or when he eats a spicy foods. He also would like 5, 5 mg Ambiens for when he travels to [place] for Jet lag.     Review of systems:  Review of Systems   Constitutional:  Negative for fever.   Eyes: Negative.    Respiratory:  Positive for cough (When eating spicy food, or with dry throat). Negative for shortness of breath.    Cardiovascular:  Negative for chest pain and leg swelling.   Gastrointestinal:  Negative for abdominal pain, nausea and vomiting.   Musculoskeletal:  Positive for myalgias.   Psychiatric/Behavioral:  The patient is nervous/anxious (At office).       Past Medical History:  Patient Active Problem List    Diagnosis Date Noted    Adjustment insomnia 02/18/2023    Mild aortic stenosis by prior echocardiogram 11/12/2022    Dyslipidemia 02/09/2022    H/O prostatectomy 02/09/2022    Elevated blood pressure reading 02/09/2022    Erectile dysfunction 02/09/2022    Memory loss 02/09/2022       Past Surgical History:    has no past surgical history on file.    Medications:  Current Outpatient Medications   Medication Sig Dispense Refill    zolpidem (AMBIEN) 5 MG Tab Take 1 Tablet by mouth at bedtime as needed for Sleep for up to 5 days. 5 Tablet 0    atorvastatin (LIPITOR) 40 MG Tab Take 1 Tablet by mouth every evening. (Patient taking  differently: Take 20 mg by mouth every evening.) 30 Tablet 6     No current facility-administered medications for this visit.       Allergies:  No Known Allergies    Family History:   family history is not on file.     Social History:    Social History     Tobacco Use    Smoking status: Never    Smokeless tobacco: Never   Vaping Use    Vaping Use: Never used   Substance Use Topics    Alcohol use: Yes     Alcohol/week: 8.4 oz     Types: 14 Glasses of wine per week    Drug use: Never     Employment: Retired  Activity Level: high  Living situation:  lives with wife  Recent travel:  to [place]      Physical Exam:  Vitals:    02/17/23 0904   BP: (!) 157/73   Pulse: 69   Temp: 36.9 °C (98.4 °F)   SpO2: 96%     Body mass index is 27.28 kg/m².  Physical Exam  Constitutional:       General: He is not in acute distress.     Appearance: He is not toxic-appearing.   HENT:      Head: Normocephalic and atraumatic.      Mouth/Throat:      Mouth: Mucous membranes are dry.      Pharynx: Oropharynx is clear. No oropharyngeal exudate or posterior oropharyngeal erythema.   Eyes:      Extraocular Movements: Extraocular movements intact.      Pupils: Pupils are equal, round, and reactive to light.   Cardiovascular:      Rate and Rhythm: Normal rate and regular rhythm.      Pulses: Normal pulses.      Heart sounds: No murmur heard.    No friction rub. No gallop.   Pulmonary:      Effort: Pulmonary effort is normal. No respiratory distress.      Breath sounds: Normal breath sounds. No stridor. No wheezing or rales.   Abdominal:      General: Abdomen is flat. Bowel sounds are normal. There is no distension.      Palpations: There is no mass.      Tenderness: There is no abdominal tenderness. There is no guarding.   Musculoskeletal:         General: Normal range of motion.      Cervical back: Normal range of motion.      Right lower leg: No edema.      Left lower leg: No edema.   Skin:     General: Skin is warm and dry.      Findings: No  lesion or rash.   Neurological:      General: No focal deficit present.      Mental Status: He is alert and oriented to person, place, and time.      Gait: Gait normal.      Deep Tendon Reflexes: Reflexes normal.   Psychiatric:         Mood and Affect: Mood normal.        Labs:  Total cholesterol: wnl  Tg: wnl  HDL: wnl  VLDL: wnl  LDL: 106    Imaging:  none    Assessment/Plan:  Dyslipidemia  Patient has been doing fine with current statin dose of 20 mg of atorvastatin daily. Has had improved muscular pain. Unsure what is the causal factor but he has noticed that he had improvement when he was in [place].     Plan:  -Follow up lipid panel  -Continue current dosage      Elevated blood pressure reading  Has elevated blood pressure but it is within normal limits at home. Will continue to follow.     Adjustment insomnia  Difficulty with sleeping when going to [place]. Requested 5 mg Ambien, for five days.   -PDMP reviewed             All imaging results and lab results and consult notes are reviewed at this visit.  Followup: No follow-ups on file.    Ragini Kilpatrick MD  Internal Medicine PGY-1

## 2023-02-18 PROBLEM — F51.02 ADJUSTMENT INSOMNIA: Status: ACTIVE | Noted: 2023-02-18

## 2023-02-18 NOTE — ASSESSMENT & PLAN NOTE
Patient has been doing fine with current statin dose of 20 mg of atorvastatin daily. Has had improved muscular pain. Unsure what is the causal factor but he has noticed that he had improvement when he was in [facility].     Plan:  -Follow up lipid panel  -Continue current dosage

## 2023-02-18 NOTE — ASSESSMENT & PLAN NOTE
Difficulty with sleeping when going to [place]. Requested 5 mg Ambien, for five days.   -PDMP reviewed

## 2023-04-18 ENCOUNTER — TELEPHONE (OUTPATIENT)
Dept: INTERNAL MEDICINE | Facility: OTHER | Age: 78
End: 2023-04-18
Payer: MEDICARE

## 2023-04-18 NOTE — TELEPHONE ENCOUNTER
Pharmacy called asking for script of atorvastatin to be sent with 100 day supply as insurance will provide a better deal if it is written that way. Pharmacy will be faxing information over

## 2023-04-19 RX ORDER — ATORVASTATIN CALCIUM 40 MG/1
40 TABLET, FILM COATED ORAL NIGHTLY
Qty: 100 TABLET | Refills: 0 | Status: SHIPPED | OUTPATIENT
Start: 2023-04-19 | End: 2023-07-28

## 2023-05-23 ENCOUNTER — OFFICE VISIT (OUTPATIENT)
Dept: INTERNAL MEDICINE | Facility: OTHER | Age: 78
End: 2023-05-23
Payer: MEDICARE

## 2023-05-23 VITALS
DIASTOLIC BLOOD PRESSURE: 72 MMHG | SYSTOLIC BLOOD PRESSURE: 149 MMHG | TEMPERATURE: 97.4 F | WEIGHT: 199 LBS | OXYGEN SATURATION: 95 % | HEART RATE: 67 BPM | BODY MASS INDEX: 26.37 KG/M2 | HEIGHT: 73 IN

## 2023-05-23 DIAGNOSIS — R05.2 SUBACUTE COUGH: ICD-10-CM

## 2023-05-23 PROCEDURE — 3077F SYST BP >= 140 MM HG: CPT

## 2023-05-23 PROCEDURE — 3078F DIAST BP <80 MM HG: CPT

## 2023-05-23 PROCEDURE — 99213 OFFICE O/P EST LOW 20 MIN: CPT | Mod: GC

## 2023-05-23 RX ORDER — LORATADINE 10 MG/1
10 TABLET ORAL DAILY
Qty: 30 TABLET | Refills: 0 | Status: SHIPPED
Start: 2023-05-23 | End: 2023-08-28

## 2023-05-23 NOTE — PROGRESS NOTES
Teaching Physician Attestation      Level of Participation    I have personally interviewed and examined the patient.  In addition, I discussed with the resident physician the patient's history, exam, assessment and plan in detail.  Topics listed in my addendum were the focus of the visit.  Healthcare maintenance was not addressed this visit unless listed as a topic in my addendum.  I agree with the plan as written along with the following additions/modifications:        Likely reactive airway disease secondary to significant allergen exposures, versus possible postnasal drip triggering chronic cough, improving  -Significant allergen exposure when cleaning a room of his house, subsequent cough for 3 weeks although is gradually improving.  Initially had nasal and congestion as well.  On exam lungs are slightly diminished diffusely bilaterally but no wheeze including on forced exhalation, no respiratory distress, saturating normally on room air.  Denied GERD symptoms.  Did also mention some potential mild aspiration events, although bedside swallow with water negative today.    Plan  -Trial Claritin for possible nasal congestion.  Consider adding Flovent if cough continues to linger  -Chest wall discomfort was nonexertional and reproducible to palpation by the patient when occurring, he likely rib irritation in the setting of coughing, will monitor.  Caution any chest pain or shortness of breath lasting more than 5 minutes not related to coughing patient should seek care immediately.  -We will obtain chest x-ray for further evaluation given duration of cough, although given afebrile with clear lungs and stable vitals pna is unlikely.  -Monitor ? aspiration situation closely.

## 2023-05-23 NOTE — PATIENT INSTRUCTIONS
Is nice meeting you today Patricio!    Today we discussed your cough.  It is possible this is related to allergies with ongoing postnasal drip versus inhaled allergens when he cleaned your basement versus aspiration.    Recommend getting a chest x-ray to evaluate your lungs.  Otherwise in the meantime we can treat you with an allergy medication called Claritin.    Anticipate your symptoms should improve over the next couple weeks.  If things are worsening please reach out.  Encourage you to follow-up up with Dr. Kilpatrick in 2 weeks to make sure things are resolving.    You can get your imaging done at either [facility] or [facility].

## 2023-05-23 NOTE — PROGRESS NOTES
"    Established Patient    Patient Care Team:  Ragini Kilpatrick M.D. as PCP - General (Internal Medicine)    Patricio Huffman is a 78 y.o. male who presents today with the following Chief Complaint(s): Follow up for The encounter diagnosis was Subacute cough.    HPI:  Problem   Subacute Cough    Patient complaining of subacute cough that began ~ 3 weeks prior after cleaning out his basement.   At that time reports felt it was allergies with significant red, itchy eyes, sneezing and coughing.   Since that time he states his other allergy symptoms have resolved but the cough has persisted though slowly improving.   Visit today is prompted due to an episode that occurred 3 nights prior where he woke with sudden onset \"spasm\" in his R chest/lung around midnight with subsequent coughing fit to the point of episode of vomiting.  He states the chest discomfort is what woke him and it improved with coughing.   Since then he states the cough has gone back to similar to prior with daily productive cough of clear mucus.   He reports a history of acid reflux though this does not appear previously documented in his chart. Specifically he notes this happens when he drinks champagne, which he had a glass of white wine 3 nights ago. He also notes having aspiration events that occur ~ once per week though denies any symptoms of dysphagia.     He does not have a history of asthma. Denies any ongoing wheeze, sob, or ongoing upper respiratory symptoms. Denies any repeat chest pain or spasm event.            ROS:     Denies any new chest pain or shortness of breath.  No changes to urinary or bowel function.  See HPI.    No past medical history on file.  Social History     Tobacco Use    Smoking status: Never    Smokeless tobacco: Never   Vaping Use    Vaping Use: Never used   Substance Use Topics    Alcohol use: Yes     Alcohol/week: 8.4 oz     Types: 14 Glasses of wine per week    Drug use: Never     Current Outpatient Medications " "  Medication Sig Dispense Refill    loratadine (CLARITIN) 10 MG Tab Take 1 Tablet by mouth every day. 30 Tablet 0    atorvastatin (LIPITOR) 40 MG Tab Take 1 Tablet by mouth every evening for 100 days. 100 Tablet 0     No current facility-administered medications for this visit.     Physical Exam:  BP (!) 149/72 (BP Location: Left arm, Patient Position: Sitting, BP Cuff Size: Adult)   Pulse 67   Temp 36.3 °C (97.4 °F) (Temporal)   Ht 1.854 m (6' 1\")   Wt 90.3 kg (199 lb)   SpO2 95%   BMI 26.25 kg/m²   General: Well developed, well nourished male, in no distress.  Eyes: Conjunctiva without any obvious injection or erythema.   Cardiovascular: Heart is regular with out murmur  Lungs: coarse rhonchi present predominantly over the R lower and middle lobe. No respiratory distress.  Abd: Soft, non-tender  Ext: No edema    Assessment and Plan:     1. Subacute cough  Potentially multifactorial etiology of reactive airway vs post nasal drip vs aspiration event.   Suspect initial insult likely reactive airway disease in the setting of significant allergen exposure 3 weeks ago, though does not report any significant wheeze, dyspnea,or persistent chest/lung tightness.   Patient denies having ongoing upper respiratory symptoms, though as it is allergy season query persistent post nasal drip that may be contributing to cough.   For the acute episode that occurred 3 nights ago, concern for possible aspiration event with sudden onset spasm and coughing fit, though patient passed bedside swallow evaluation in clinic today so this is less likely to be a chronic issue.    For now reasonable to get cxr for further evaluation of possible pneumonia though low suspicion in absence of other infectious symptoms.   Will also trial claritin to address any post nasal drip that may be contributing.   Hold off on ICS for now as patient is not wheezing.   Reviewed return precautions if worsening cough, fever, or sob.   If recurrent chest " pain, patient to present to ED.   Follow up in 2 weeks with PCP.     - DX-CHEST-2 VIEWS; Future  - loratadine (CLARITIN) 10 MG Tab; Take 1 Tablet by mouth every day.  Dispense: 30 Tablet; Refill: 0      Return in about 2 weeks (around 6/6/2023) for cough.    Jaki Mueller M.D. PGY2  [facility]

## 2023-05-24 ENCOUNTER — HOSPITAL ENCOUNTER (OUTPATIENT)
Dept: RADIOLOGY | Facility: MEDICAL CENTER | Age: 78
End: 2023-05-24
Payer: MEDICARE

## 2023-05-25 PROBLEM — R05.2 SUBACUTE COUGH: Status: ACTIVE | Noted: 2023-05-25

## 2023-07-24 ENCOUNTER — OFFICE VISIT (OUTPATIENT)
Dept: INTERNAL MEDICINE | Facility: OTHER | Age: 78
End: 2023-07-24
Payer: MEDICARE

## 2023-07-24 VITALS
BODY MASS INDEX: 26.64 KG/M2 | HEART RATE: 64 BPM | DIASTOLIC BLOOD PRESSURE: 77 MMHG | TEMPERATURE: 97.7 F | WEIGHT: 201 LBS | OXYGEN SATURATION: 98 % | HEIGHT: 73 IN | SYSTOLIC BLOOD PRESSURE: 136 MMHG

## 2023-07-24 DIAGNOSIS — R06.2 WHEEZING: ICD-10-CM

## 2023-07-24 DIAGNOSIS — J44.9 OBSTRUCTIVE LUNG DISEASE (GENERALIZED) (HCC): ICD-10-CM

## 2023-07-24 DIAGNOSIS — R93.89 ABNORMAL CHEST X-RAY: ICD-10-CM

## 2023-07-24 PROBLEM — R05.2 SUBACUTE COUGH: Status: RESOLVED | Noted: 2023-05-25 | Resolved: 2023-07-24

## 2023-07-24 PROCEDURE — 3075F SYST BP GE 130 - 139MM HG: CPT | Mod: GC

## 2023-07-24 PROCEDURE — 99214 OFFICE O/P EST MOD 30 MIN: CPT | Mod: GC

## 2023-07-24 PROCEDURE — 3078F DIAST BP <80 MM HG: CPT | Mod: GC

## 2023-07-24 RX ORDER — ALBUTEROL SULFATE 90 UG/1
2 AEROSOL, METERED RESPIRATORY (INHALATION) EVERY 4 HOURS PRN
Qty: 1 EACH | Refills: 1 | Status: SHIPPED
Start: 2023-07-24 | End: 2023-08-28

## 2023-07-24 ASSESSMENT — ENCOUNTER SYMPTOMS
SPUTUM PRODUCTION: 0
CLAUDICATION: 0
DEPRESSION: 0
NECK PAIN: 0
ORTHOPNEA: 0
ABDOMINAL PAIN: 0
BRUISES/BLEEDS EASILY: 0
CONSTIPATION: 0
NERVOUS/ANXIOUS: 0
SORE THROAT: 0
DOUBLE VISION: 0
EYE DISCHARGE: 0
CHILLS: 0
DIARRHEA: 0
DIZZINESS: 0
WEAKNESS: 0
FEVER: 0
WHEEZING: 1
BACK PAIN: 0
HEADACHES: 0
PALPITATIONS: 0
BLURRED VISION: 0
INSOMNIA: 0
VOMITING: 0
SENSORY CHANGE: 0
BLOOD IN STOOL: 0
COUGH: 1
HEARTBURN: 0
NAUSEA: 0
WEIGHT LOSS: 0
SHORTNESS OF BREATH: 0

## 2023-07-24 NOTE — PROGRESS NOTES
Subjective:     CC: Follow-up on his previous clinic visit for subacute cough, chest x-ray results    HPI:   Patricio presents today with his wife to discuss results of his chest x-ray were done previously.  Past medically significant for prostate cancer (unknown stage) s/p prostatectomy 10 years ago, hypertension, hyperlipidemia, mild aortic stenosis per prior echocardiogram.    Patient was previously seen for subacute cough that lasted for about 3-4 weeks.  Cough developed after patient was cleaning a room's house and was exposed to a lot of dust and allergen with pain comes.  Given his prolonged symptoms of cough, chest x-ray was done.  X-ray shows obstructive changes in the lung fields.  Patient denies any smoking exposure, no history of alpha-1 antitrypsin deficiency.    Patient states that yesterday afternoon he was swimming in [facility] and states that the water was choppy, at which point he states he swallows some of the water and developed a cough that lasted well into the night.  At night, when he was having dinner with his family, he experienced a coughing fit with productive mucus that was white/clear in nature. No hemoptysis.  Mucus was clear/white in color.  Patient denies any weight loss, denies any history of melena or hematochezia.  Previously, patient states that he had spirometry testing done annually by his physician.  Unclear why this was done annually. His last testing was 5 years ago.  Patient denies any recent travel, no exposure, no allergen exposures other than the episode when he was getting the room.  Patient is vaccinated against COVID-19, received 4 doses of the Materna vaccine.  He did contract COVID and some of confirmed to, but denies any known COVID symptoms.  There may be possible concern for asthma.  On physical exam, there is slight wheezing, for respiration.  Patient benefit from pulmonary function testing and risk inhaler with albuterol.      Problem   Subacute Cough (Resolved)  "   Patient complaining of subacute cough that began ~ 3 weeks prior after cleaning out his basement.   At that time reports felt it was allergies with significant red, itchy eyes, sneezing and coughing.   Since that time he states his other allergy symptoms have resolved but the cough has persisted though slowly improving.   Visit today is prompted due to an episode that occurred 3 nights prior where he woke with sudden onset \"spasm\" in his R chest/lung around midnight with subsequent coughing fit to the point of episode of vomiting.  He states the chest discomfort is what woke him and it improved with coughing.   Since then he states the cough has gone back to similar to prior with daily productive cough of clear mucus.   He reports a history of acid reflux though this does not appear previously documented in his chart. Specifically he notes this happens when he drinks champagne, which he had a glass of white wine 3 nights ago. He also notes having aspiration events that occur ~ once per week though denies any symptoms of dysphagia.     He does not have a history of asthma. Denies any ongoing wheeze, sob, or ongoing upper respiratory symptoms. Denies any repeat chest pain or spasm event.          ROS:  Review of Systems   Constitutional:  Negative for chills, fever and weight loss.   HENT:  Negative for congestion, hearing loss and sore throat.    Eyes:  Negative for blurred vision, double vision and discharge.   Respiratory:  Positive for cough and wheezing. Negative for sputum production and shortness of breath.    Cardiovascular:  Negative for chest pain, palpitations, orthopnea, claudication and leg swelling.   Gastrointestinal:  Negative for abdominal pain, blood in stool, constipation, diarrhea, heartburn, melena, nausea and vomiting.   Genitourinary:  Negative for dysuria, frequency, hematuria and urgency.   Musculoskeletal:  Negative for back pain, joint pain and neck pain.   Skin:  Negative for rash. " "  Neurological:  Negative for dizziness, sensory change, weakness and headaches.   Endo/Heme/Allergies:  Negative for environmental allergies. Does not bruise/bleed easily.   Psychiatric/Behavioral:  Negative for depression. The patient is not nervous/anxious and does not have insomnia.        Objective:     Exam:  /77 (BP Location: Left arm, Patient Position: Sitting, BP Cuff Size: Adult)   Pulse 64   Temp 36.5 °C (97.7 °F) (Temporal)   Ht 1.854 m (6' 1\")   Wt 91.2 kg (201 lb)   SpO2 98%   BMI 26.52 kg/m²  Body mass index is 26.52 kg/m².    Physical Exam  Constitutional:       General: He is not in acute distress.     Appearance: Normal appearance. He is normal weight. He is not ill-appearing.   HENT:      Head: Normocephalic and atraumatic.      Right Ear: External ear normal.      Left Ear: External ear normal.      Nose: Nose normal. No rhinorrhea.      Mouth/Throat:      Mouth: Mucous membranes are moist.      Pharynx: Oropharynx is clear.   Eyes:      General:         Right eye: No discharge.         Left eye: No discharge.      Extraocular Movements: Extraocular movements intact.      Pupils: Pupils are equal, round, and reactive to light.   Neck:      Vascular: No carotid bruit.   Cardiovascular:      Rate and Rhythm: Normal rate and regular rhythm.      Pulses: Normal pulses.      Heart sounds: Normal heart sounds. No murmur heard.     No friction rub.   Pulmonary:      Effort: Pulmonary effort is normal. No respiratory distress.      Breath sounds: Normal breath sounds. No wheezing or rhonchi.   Abdominal:      General: Abdomen is flat. Bowel sounds are normal.      Palpations: Abdomen is soft.      Tenderness: There is no abdominal tenderness. There is no right CVA tenderness or left CVA tenderness.      Hernia: No hernia is present.   Musculoskeletal:         General: No tenderness. Normal range of motion.      Cervical back: Normal range of motion. No rigidity.      Right lower leg: No " edema.      Left lower leg: No edema.   Skin:     General: Skin is warm and dry.      Capillary Refill: Capillary refill takes less than 2 seconds.      Findings: No lesion or rash.   Neurological:      General: No focal deficit present.      Mental Status: He is alert and oriented to person, place, and time. Mental status is at baseline.   Psychiatric:         Mood and Affect: Mood normal.         Behavior: Behavior normal.         Thought Content: Thought content normal.         Judgment: Judgment normal.       A chaperone was offered to the patient during today's exam.: Chaperone wife was present.    Labs:   Previous imaging and lab were discussed with the patient and family.  No follow-up questions.    Assessment & Plan:     78 y.o. male with the following -     1. Wheezing  2. Abnormal chest x-ray  3. Obstructive lung disease (generalized) (HCC)  Patient states that yesterday afternoon he was swimming in [facility] and states that the water was choppy, at which point he states he swallows some of the water and developed a cough that lasted well into the night.  At night, when he was having dinner with his family, he experienced a coughing fit with productive mucus that was white/clear in nature. No hemoptysis.  Mucus was clear/white in color.  Patient denies any weight loss, denies any history of melena or hematochezia.  Previously, patient states that he had spirometry testing done annually by his physician.  Unclear why this was done annually.  His last testing was 5 years ago.  Patient denies any recent travel, no exposure, no allergen exposures other than the episode when he was getting the room.  Patient is vaccinated against COVID-19, received 4 doses of the Materna vaccine.  He did contract COVID and some of confirmed to, but denies any known COVID symptoms.  There may be possible concern for asthma.  On physical exam, there is slight wheezing, for respiration.  Patient benefit from pulmonary function  testing and risk inhaler with albuterol.  - PULMONARY FUNCTION TESTS -Test requested: Complete Pulmonary Function Test; Future  - albuterol 108 (90 Base) MCG/ACT Aero Soln inhalation aerosol; Inhale 2 Puffs every four hours as needed for Shortness of Breath.  Dispense: 1 Each; Refill: 1  - Spacer/Aero-Holding Chambers Device; 1 Units one time for 1 dose.  Dispense: 1 Each; Refill: 0    4. Mild aortic stenosis  Previously, patient was told that he has an aortic murmur, underwent echocardiogram testing in November 2022.  Patient was found to have mild aortic stenosis with an EF of over 65%.  Patient is not symptomatic, denies any dyspnea on exertion, orthopnea, lower extremity edema.  Patient is a very active lifestyle, patient is a very active lifestyle, denies any difficulty with ADLs.  Per guidelines, since patient is asymptomatic, with mild aortic stenosis, he should be monitored with an echocardiogram every 3 to 5 years.  We will reassess in the future, with a potential echocardiogram in 2025.      I spent a total of 40 minutes with record review, exam, communication with the patient, communication with other providers, and documentation of this encounter.      Return in about 5 weeks (around 8/28/2023).    Please note that this dictation was created using voice recognition software. I have made every reasonable attempt to correct obvious errors, but I expect that there are errors of grammar and possibly content that I did not discover before finalizing the note.

## 2023-07-24 NOTE — PROGRESS NOTES
Teaching Physician Attestation      Level of Participation    I have personally interviewed and examined the patient.  In addition, I discussed with the resident physician the patient's history, exam, assessment and plan in detail.  Topics listed in my addendum were the focus of the visit.  Healthcare maintenance was not addressed this visit unless listed as a topic in my addendum.  I agree with the plan as written along with the following additions/modifications:    Suspect reactive airway disease with allergy trigger  -Patient presented with subacute cough, was trialed on Claritin with significant improvement in cough but not complete resolution.  Reports swimming yesterday and a small aspiration event with some subsequent coughing which has subsequently resolved back to his baseline.  No fevers, no chest burning/heartburn symptoms or acid taste in the mouth in the mornings..  On exam today he has diffusely slightly diminished lung sounds bilaterally with potentially a faint wheeze bilaterally, more prominent with forced exhalation.  He is in no respiratory distress, speaking in full sentences, no assessory muscle use, saturating 98% on room air.  Chest x-ray obtained last visit showed changes possibly consistent with hyperinflation, also changes consistent with atelectasis versus atypical pneumonitis, otherwise neg.    Plan  -Continue Claritin on a daily basis.  Add Deedee pot.  Consider flonase, particularly if any anticholinergic sx develop, although tolerating well for now.  -Obtain pulmonary function testing.  Discussed trialing controller medication prior to this, patient states he would prefer to wait for pulmonary function tests as his overall exercise capacity is good, he is able to swim and hike without significant shortness of breath generally, reports only occasional wheezing in the morning which self resolves.  Rescue inhaler issued with spacer.  Counseled to not use rescue inhaler if possible for 24  hours before testing to allow washout  -Reviewed any chest pain or shortness of breath more than 5 minutes after using inhaler patient should present to the emergency room for evaluation  -History of mild aortic stenosis noted on echo last year.  Given mild severity, intermittent current symptoms, wheezing and cough as predominant symptoms, and excellent exercise tolerance, doubt that current symptoms represent progression of aortic stenosis    Mild aortic stenosis  -Not directly addressed today, echo noted with mild arctic stenosis and also proximal septal thickening, otherwise negative.  Will need follow-up for this, given asymptomatic with excellent exercise tolerance would likely benefit from follow-up echo in 3 years, thus due in 2025.  Will need dedicated visit for discussion.    -Follow-up once pulmonary function testing obtained.  Also fully establish care with new resident PCP Dr. Hernandes.

## 2023-08-28 ENCOUNTER — OFFICE VISIT (OUTPATIENT)
Dept: INTERNAL MEDICINE | Facility: OTHER | Age: 78
End: 2023-08-28
Payer: MEDICARE

## 2023-08-28 VITALS
TEMPERATURE: 62 F | HEIGHT: 73 IN | OXYGEN SATURATION: 97 % | HEART RATE: 62 BPM | BODY MASS INDEX: 26.29 KG/M2 | DIASTOLIC BLOOD PRESSURE: 82 MMHG | WEIGHT: 198.4 LBS | SYSTOLIC BLOOD PRESSURE: 155 MMHG

## 2023-08-28 DIAGNOSIS — D23.9 DERMATOFIBROMA: ICD-10-CM

## 2023-08-28 DIAGNOSIS — Z85.46 HISTORY OF PROSTATE CANCER: ICD-10-CM

## 2023-08-28 DIAGNOSIS — R73.9 HYPERGLYCEMIA: ICD-10-CM

## 2023-08-28 PROCEDURE — 3077F SYST BP >= 140 MM HG: CPT

## 2023-08-28 PROCEDURE — 3079F DIAST BP 80-89 MM HG: CPT

## 2023-08-28 PROCEDURE — 99213 OFFICE O/P EST LOW 20 MIN: CPT | Mod: GC

## 2023-08-28 RX ORDER — ATORVASTATIN CALCIUM 40 MG/1
40 TABLET, FILM COATED ORAL NIGHTLY
COMMUNITY

## 2023-08-28 NOTE — PROGRESS NOTES
Subjective:     CC: Follow-up on previous cough, right ankle pain    HPI:   Patricio presents today with chief concerns of right ankle pain. Past medical history significant for history of prostate cancer s/p prostatectomy, hyperlipidemia.    Patient was previously seen for persistent cough that occurred after a swimming episode where patient ingested some like to water.  Says the symptoms have been much better, he has not undergone PFTs at this point indication to pursue this testing.    This visit, patient complains of a right ankle bump present on the right medial aspect of the shin.  States that this has been present for a while.  Sometimes bothers him while he wears high ankle shoes for hiking and running.  On physical exam, appears to be a dermatofibroma.  Benign condition.  Continue to monitor.      ROS:  Review of Systems   Constitutional:  Negative for chills, fever and weight loss.   HENT:  Negative for congestion, hearing loss and sore throat.    Eyes:  Negative for blurred vision, double vision and discharge.   Respiratory:  Negative for cough, sputum production, shortness of breath and wheezing.    Cardiovascular:  Negative for chest pain, palpitations, orthopnea, claudication and leg swelling.   Gastrointestinal:  Negative for abdominal pain, blood in stool, constipation, diarrhea, heartburn, melena, nausea and vomiting.   Genitourinary:  Negative for dysuria, frequency, hematuria and urgency.   Musculoskeletal:  Negative for back pain, joint pain and neck pain.   Skin:  Negative for rash.   Neurological:  Negative for dizziness, sensory change, weakness and headaches.   Endo/Heme/Allergies:  Negative for environmental allergies. Does not bruise/bleed easily.   Psychiatric/Behavioral:  Negative for depression. The patient is not nervous/anxious and does not have insomnia.        Objective:     Exam:  BP (!) 155/82 (BP Location: Left arm, Patient Position: Sitting, BP Cuff Size: Adult)   Pulse 62   Temp  "(!) 16.7 °C (62 °F) (Temporal)   Ht 1.85 m (6' 0.84\")   Wt 90 kg (198 lb 6.4 oz)   SpO2 97%   BMI 26.29 kg/m²  Body mass index is 26.29 kg/m².    Physical Exam  Constitutional:       General: He is not in acute distress.     Appearance: Normal appearance. He is normal weight. He is not ill-appearing.   HENT:      Head: Normocephalic and atraumatic.      Right Ear: External ear normal.      Left Ear: External ear normal.      Nose: Nose normal. No rhinorrhea.      Mouth/Throat:      Mouth: Mucous membranes are moist.      Pharynx: Oropharynx is clear.   Eyes:      General:         Right eye: No discharge.         Left eye: No discharge.      Extraocular Movements: Extraocular movements intact.      Pupils: Pupils are equal, round, and reactive to light.   Neck:      Vascular: No carotid bruit.   Cardiovascular:      Rate and Rhythm: Normal rate and regular rhythm.      Pulses: Normal pulses.      Heart sounds: Normal heart sounds. No murmur heard.     No friction rub.   Pulmonary:      Effort: Pulmonary effort is normal. No respiratory distress.      Breath sounds: Normal breath sounds. No wheezing or rhonchi.   Abdominal:      General: Abdomen is flat. Bowel sounds are normal.      Palpations: Abdomen is soft.      Tenderness: There is no abdominal tenderness. There is no right CVA tenderness or left CVA tenderness.      Hernia: No hernia is present.   Musculoskeletal:         General: No tenderness. Normal range of motion.      Cervical back: Normal range of motion. No rigidity.      Right lower leg: No edema.      Left lower leg: No edema.   Skin:     General: Skin is warm and dry.      Capillary Refill: Capillary refill takes less than 2 seconds.      Findings: Lesion (Right ankle dermatofibroma) present. No rash.   Neurological:      General: No focal deficit present.      Mental Status: He is alert and oriented to person, place, and time. Mental status is at baseline.   Psychiatric:         Mood and Affect: " Mood normal.         Behavior: Behavior normal.         Thought Content: Thought content normal.         Judgment: Judgment normal.         A chaperone was offered to the patient during today's exam.: Patient declined.    Labs:   Previous labs discussed with the patient, no follow-up questions at this point.    Assessment & Plan:     78 y.o. male with the following -     1. Dermatofibroma  This visit, patient complains of a right ankle bump present on the right medial aspect of the shin.  States that this has been present for a while.  Sometimes bothers him while he wears high ankle shoes for hiking and running.  On physical exam, appears to be a dermatofibroma.  Benign condition.  Continue to monitor.    2. History of prostate cancer  Patient is a history of prostate cancer status post prostatectomy.  Annual PSA testing. Patient denies any back pain, urinary symptoms, or red flag symptoms.  - PROSTATE SPECIFIC AG SCREENING; Future    3. Hyperglycemia  - HEMOGLOBIN A1C; Future      I spent a total of 30 minutes with record review, exam, communication with the patient, communication with other providers, and documentation of this encounter.      Return in about 3 months (around 11/28/2023).    Please note that this dictation was created using voice recognition software. I have made every reasonable attempt to correct obvious errors, but I expect that there are errors of grammar and possibly content that I did not discover before finalizing the note.

## 2023-08-30 ASSESSMENT — ENCOUNTER SYMPTOMS
EYE DISCHARGE: 0
CONSTIPATION: 0
FEVER: 0
SENSORY CHANGE: 0
DEPRESSION: 0
BLURRED VISION: 0
NERVOUS/ANXIOUS: 0
BACK PAIN: 0
SPUTUM PRODUCTION: 0
WEIGHT LOSS: 0
WEAKNESS: 0
HEADACHES: 0
ORTHOPNEA: 0
SORE THROAT: 0
DIZZINESS: 0
PALPITATIONS: 0
VOMITING: 0
BRUISES/BLEEDS EASILY: 0
HEARTBURN: 0
SHORTNESS OF BREATH: 0
COUGH: 0
CLAUDICATION: 0
NECK PAIN: 0
BLOOD IN STOOL: 0
WHEEZING: 0
ABDOMINAL PAIN: 0
NAUSEA: 0
INSOMNIA: 0
CHILLS: 0
DOUBLE VISION: 0
DIARRHEA: 0

## 2023-11-21 ENCOUNTER — APPOINTMENT (OUTPATIENT)
Dept: INTERNAL MEDICINE | Facility: OTHER | Age: 78
End: 2023-11-21
Payer: MEDICARE

## 2023-12-05 ENCOUNTER — OFFICE VISIT (OUTPATIENT)
Dept: INTERNAL MEDICINE | Facility: OTHER | Age: 78
End: 2023-12-05
Payer: MEDICARE

## 2023-12-05 VITALS
WEIGHT: 202.6 LBS | HEART RATE: 70 BPM | SYSTOLIC BLOOD PRESSURE: 146 MMHG | DIASTOLIC BLOOD PRESSURE: 77 MMHG | BODY MASS INDEX: 26.85 KG/M2 | OXYGEN SATURATION: 95 % | TEMPERATURE: 97.5 F

## 2023-12-05 DIAGNOSIS — R03.0 ELEVATED BLOOD PRESSURE READING: ICD-10-CM

## 2023-12-05 DIAGNOSIS — R73.09 ELEVATED GLUCOSE: ICD-10-CM

## 2023-12-05 DIAGNOSIS — I35.0 MILD AORTIC STENOSIS BY PRIOR ECHOCARDIOGRAM: ICD-10-CM

## 2023-12-05 DIAGNOSIS — S46.212A STRAIN OF LEFT BICEPS TENDON: ICD-10-CM

## 2023-12-05 DIAGNOSIS — M54.50 ACUTE RIGHT-SIDED LOW BACK PAIN WITHOUT SCIATICA: ICD-10-CM

## 2023-12-05 DIAGNOSIS — R73.09 ELEVATED HEMOGLOBIN A1C: ICD-10-CM

## 2023-12-05 DIAGNOSIS — F51.02 ADJUSTMENT INSOMNIA: ICD-10-CM

## 2023-12-05 DIAGNOSIS — E78.5 DYSLIPIDEMIA: ICD-10-CM

## 2023-12-05 PROCEDURE — 3078F DIAST BP <80 MM HG: CPT

## 2023-12-05 PROCEDURE — 3077F SYST BP >= 140 MM HG: CPT

## 2023-12-05 PROCEDURE — 99214 OFFICE O/P EST MOD 30 MIN: CPT | Mod: GC

## 2023-12-05 RX ORDER — ZOLPIDEM TARTRATE 5 MG/1
5 TABLET ORAL NIGHTLY PRN
Qty: 30 TABLET | Refills: 0 | Status: SHIPPED | OUTPATIENT
Start: 2023-12-05 | End: 2024-01-04

## 2023-12-05 RX ORDER — LIDOCAINE 4 G/G
1 PATCH TOPICAL EVERY 24 HOURS
Qty: 30 PATCH | Refills: 1 | Status: SHIPPED | OUTPATIENT
Start: 2023-12-05

## 2023-12-05 NOTE — PROGRESS NOTES
Date of Service:  12/5/2023    CC: Follow up     HPI:  Patricio Huffman  is a 78 y.o. male with a medical history including dyslipidemia, adjustment insomnia, prostatectomy and mild aortic stenosis seen by prior ECHO. Patient presents with pain in his left glut or spine when he stands up from bad chairs. This has been going on for a few months. The pain is usually a 7/10 pain and prevents patient from getting up. Pain usually lasts around 20-30 seconds and then disappears. This has never happened before. He has not had any numbness or tingling in that area and have it radiate.   He has been having left shoulder discomfor after a move lifting heavy objects. The pain is mid-shoulder. It hurts when he rotates his arm backward. Patient also would like zolpidem for when he travels to [place]. He gets insomnia when he travels and changes time zones.   Patient also has been having a strange thing on his cheeks for the past two months. He often feels that it hurts and he bites his cheek while chewing.       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    Medications:  Current Outpatient Medications   Medication Sig Dispense Refill    zolpidem (AMBIEN) 5 MG Tab Take 1 Tablet by mouth at bedtime as needed for Sleep for up to 30 days. 30 Tablet 0    lidocaine (HM LIDOCAINE PATCH) 4 % Patch Place 1 Patch on the skin every 24 hours. 30 Patch 1    atorvastatin (LIPITOR) 40 MG Tab Take 40 mg by mouth every  evening.       No current facility-administered medications for this visit.       Allergies:  No Known Allergies    Family History:   family history is not on file.     Social History:    Social History     Tobacco Use    Smoking status: Never    Smokeless tobacco: Never   Vaping Use    Vaping Use: Never used   Substance Use Topics    Alcohol use: Yes     Alcohol/week: 8.4 oz     Types: 14 Glasses of wine per week    Drug use: Never         Physical Exam:  Vitals:    12/05/23 0925   BP: (!) 146/77   Pulse: 70   Temp: 36.4 °C (97.5 °F)   SpO2: 95%     Body mass index is 26.85 kg/m².  Physical Exam  HENT:      Mouth/Throat:      Mouth: Mucous membranes are dry.   Eyes:      Extraocular Movements: Extraocular movements intact.      Pupils: Pupils are equal, round, and reactive to light.   Cardiovascular:      Rate and Rhythm: Normal rate and regular rhythm.      Pulses: Normal pulses.      Heart sounds: Normal heart sounds. No murmur heard.     No friction rub. No gallop.   Pulmonary:      Effort: Pulmonary effort is normal. No respiratory distress.      Breath sounds: Normal breath sounds. No stridor. No wheezing or rales.   Musculoskeletal:      Right lower leg: No edema.      Left lower leg: No edema.      Comments: Left shoulder pain at attachment site of biceps, direct tenderness to palpation. Lower paraspinal pain, noted on right. Pain with standing, no tenderness to palpation.    Neurological:      Mental Status: He is alert.          Labs:  PSA <0.01, Hemoglobin A1c: 5.9    Imaging:  none    Assessment/Plan:  Acute right-sided low back pain without sciatica  Patient has pain with standing from certain types of chairs. Patient has had this for the last couple of months. IN mid lumbar region.     Plan:  -Lidocaine patches, ice area, Back Exercises    Strain of left biceps tendon  Pain in left bicep tendon insertion site. Concern that patient has strained this muscle area.     Plan:  -Ice area 20 minutes  on/off  -Shoulder exercises    Adjustment insomnia  Insomnia with travel to [facility]. Patient has done well with short courses of zolpidem.     Plan:  -30 day supply of zolpidem    Elevated blood pressure reading  Patient's blood pressure at home is consistently lower. Patient feels that he has white coat elevated blood pressure.     Plan:  -Monitor  -CMP    Elevated hemoglobin A1c  Elevated A1c, 5.9. Will continue to monitor.     Plan:  -A1c  -CBC to see if hemoconcentration played a role           All imaging results and lab results and consult notes are reviewed at this visit.  Followup: No follow-ups on file.    Ragini Kilpatrick MD  Internal Medicine PGY-3

## 2023-12-05 NOTE — PATIENT INSTRUCTIONS
Follow up with Dentist about right cheek, can use lidocaine solution for mouth  Ice your shoulder 20 minutes on 20 minutes off, can take ibuprofen every six hours if continuing to hurt  Lidocaine patch 12 hours on 12 hours off for back, can ice back as well

## 2023-12-07 PROBLEM — R73.09 ELEVATED HEMOGLOBIN A1C: Status: ACTIVE | Noted: 2023-12-07

## 2023-12-07 PROBLEM — M54.50 ACUTE RIGHT-SIDED LOW BACK PAIN WITHOUT SCIATICA: Status: ACTIVE | Noted: 2023-12-07

## 2023-12-07 PROBLEM — S46.212A STRAIN OF LEFT BICEPS TENDON: Status: ACTIVE | Noted: 2023-12-07

## 2023-12-07 NOTE — ASSESSMENT & PLAN NOTE
Patient's blood pressure at home is consistently lower. Patient feels that he has white coat elevated blood pressure.     Plan:  -Monitor  -CMP

## 2023-12-07 NOTE — ASSESSMENT & PLAN NOTE
Insomnia with travel to [place]. Patient has done well with short courses of zolpidem.     Plan:  -30 day supply of zolpidem

## 2023-12-07 NOTE — ASSESSMENT & PLAN NOTE
Patient has pain with standing from certain types of chairs. Patient has had this for the last couple of months. IN mid lumbar region.     Plan:  -Lidocaine patches, ice area, Back Exercises

## 2023-12-07 NOTE — ASSESSMENT & PLAN NOTE
Pain in left bicep tendon insertion site. Concern that patient has strained this muscle area.     Plan:  -Ice area 20 minutes on/off  -Shoulder exercises

## 2023-12-07 NOTE — ASSESSMENT & PLAN NOTE
Elevated A1c, 5.9. Will continue to monitor.     Plan:  -A1c  -CBC to see if hemoconcentration played a role

## 2023-12-28 NOTE — PROGRESS NOTES
Established Patient    Patricio presents today with the following:    CC: acute appointment    HPI: 77 year old male, presents for an acute appointment. PCP Dr. Kilpatrick.      The patient reports he has a murmur detected by a nurse from [facility] to do a health check.    The patient is having muscle cramping in the bilateral thigh and calves for 15 years. Was told to take tonic water which has not helped. No trauma, history of blood clots, smoker, recent travel, surgery, immobilization.    Elevated blood pressure but no history of HTN. Home 120's/70's. No pain, smoker, feeling sick. Drank 2 cups of coffee this morning 1 hour prior to appointment.    Lipid panel Feb 2022: Chol 184, Trig 140, , HDL 47, taking lipitor as prescribed.CMP Feb 2022: LFTS wnl.     Patient Active Problem List    Diagnosis Date Noted    Dyslipidemia 02/09/2022    H/O prostatectomy 02/09/2022    Elevated blood pressure reading 02/09/2022    Erectile dysfunction 02/09/2022    Memory loss 02/09/2022       Social History     Tobacco Use    Smoking status: Never    Smokeless tobacco: Never   Vaping Use    Vaping Use: Never used   Substance Use Topics    Alcohol use: Yes     Alcohol/week: 4.2 oz     Types: 7 Glasses of wine per week    Drug use: Never       Current Outpatient Medications   Medication Sig Dispense Refill    atorvastatin (LIPITOR) 40 MG Tab Take 1 Tablet by mouth every day. 90 Tablet 1     No current facility-administered medications for this visit.       Review of Systems   Constitutional:  Negative for chills, fever, malaise/fatigue and weight loss.   HENT:  Negative for congestion and sore throat.    Eyes:  Negative for blurred vision and double vision.   Respiratory:  Negative for cough, shortness of breath and wheezing.    Cardiovascular:  Negative for chest pain and palpitations.   Gastrointestinal:  Negative for abdominal pain, constipation, diarrhea, heartburn, nausea and vomiting.   Genitourinary:  Negative for dysuria,  "frequency and urgency.   Musculoskeletal:  Positive for myalgias. Negative for falls and joint pain.   Skin:  Negative for rash.   Neurological:  Negative for dizziness, weakness and headaches.   Psychiatric/Behavioral:  Negative for depression, substance abuse and suicidal ideas. The patient is not nervous/anxious.        BP (!) 140/70 (BP Location: Left arm, Patient Position: Sitting, BP Cuff Size: Adult)   Pulse 68   Temp 36.4 °C (97.5 °F) (Temporal)   Ht 1.854 m (6' 1\")   Wt 90.6 kg (199 lb 12.8 oz)   SpO2 97%   BMI 26.36 kg/m²       PHYSICAL EXAM:  Physical Exam  Vitals and nursing note reviewed.   Constitutional:       General: He is not in acute distress.     Appearance: Normal appearance. He is not ill-appearing.   HENT:      Head: Normocephalic and atraumatic.   Cardiovascular:      Rate and Rhythm: Normal rate and regular rhythm.      Pulses: Normal pulses.      Heart sounds: Murmur heard.     No friction rub. No gallop.      Comments: 3/6 crescendo-decrescendo systolic ejection murmur most prominent at the right upper sternal border.  Pulmonary:      Effort: Pulmonary effort is normal.      Breath sounds: Normal breath sounds.   Musculoskeletal:         General: No swelling, tenderness, deformity or signs of injury.      Right lower leg: No edema.      Left lower leg: No edema.   Neurological:      General: No focal deficit present.      Mental Status: He is alert and oriented to person, place, and time.      Cranial Nerves: No cranial nerve deficit.      Sensory: No sensory deficit.      Motor: No weakness.   Psychiatric:         Mood and Affect: Mood normal.         Behavior: Behavior normal.       Note: I have reviewed all pertinent labs and diagnostic tests associated with this visit with specific comments listed under the assessment and plan below    Assessment and Plan    1. Systolic murmur  - EC-ECHOCARDIOGRAM COMPLETE W/O CONT; Future    2. Muscle cramps  -hold Lipitor two weeks to see if " no complications this improves pain. Labs to do. Follow up to reevaluate  - Comp Metabolic Panel; Future  - MAGNESIUM; Future  - TSH WITH REFLEX TO FT4; Future    3. Elevated blood pressure reading  No history of HTN.   -check BP at home daily and bring log to next appointment  -Maintain normal weight (BMI 18.5 to 24.kg/m2)  -DASH diet  -Decrease Sodium intake to less than 100meq/day (2.4g Na or 6g NaCL),   -Increase physical activity  -Limit Etoh consumption to 2 drinks/day in men and 1 drink per day in women.       4. Encounter for immunization  - INFLUENZA VACCINE, HIGH DOSE (65+ ONLY)    5. Dyslipidemia  -no indication to repeat lipid panel since completed February 2022.        Followup: Return in about 5 weeks (around 11/18/2022).      Signed by: Albert Maldonado Jr., M.D.

## 2024-03-02 SDOH — HEALTH STABILITY: PHYSICAL HEALTH: ON AVERAGE, HOW MANY MINUTES DO YOU ENGAGE IN EXERCISE AT THIS LEVEL?: 20 MIN

## 2024-03-02 SDOH — ECONOMIC STABILITY: FOOD INSECURITY: WITHIN THE PAST 12 MONTHS, THE FOOD YOU BOUGHT JUST DIDN'T LAST AND YOU DIDN'T HAVE MONEY TO GET MORE.: NEVER TRUE

## 2024-03-02 SDOH — ECONOMIC STABILITY: INCOME INSECURITY: IN THE LAST 12 MONTHS, WAS THERE A TIME WHEN YOU WERE NOT ABLE TO PAY THE MORTGAGE OR RENT ON TIME?: NO

## 2024-03-02 SDOH — ECONOMIC STABILITY: FOOD INSECURITY: WITHIN THE PAST 12 MONTHS, YOU WORRIED THAT YOUR FOOD WOULD RUN OUT BEFORE YOU GOT MONEY TO BUY MORE.: NEVER TRUE

## 2024-03-02 SDOH — HEALTH STABILITY: PHYSICAL HEALTH: ON AVERAGE, HOW MANY DAYS PER WEEK DO YOU ENGAGE IN MODERATE TO STRENUOUS EXERCISE (LIKE A BRISK WALK)?: 5 DAYS

## 2024-03-02 SDOH — ECONOMIC STABILITY: HOUSING INSECURITY: IN THE LAST 12 MONTHS, HOW MANY PLACES HAVE YOU LIVED?: 3

## 2024-03-02 SDOH — ECONOMIC STABILITY: INCOME INSECURITY: HOW HARD IS IT FOR YOU TO PAY FOR THE VERY BASICS LIKE FOOD, HOUSING, MEDICAL CARE, AND HEATING?: NOT HARD AT ALL

## 2024-03-02 ASSESSMENT — SOCIAL DETERMINANTS OF HEALTH (SDOH)
HOW HARD IS IT FOR YOU TO PAY FOR THE VERY BASICS LIKE FOOD, HOUSING, MEDICAL CARE, AND HEATING?: NOT HARD AT ALL
HOW OFTEN DO YOU HAVE A DRINK CONTAINING ALCOHOL: 4 OR MORE TIMES A WEEK
HOW OFTEN DO YOU HAVE SIX OR MORE DRINKS ON ONE OCCASION: NEVER
WITHIN THE PAST 12 MONTHS, YOU WORRIED THAT YOUR FOOD WOULD RUN OUT BEFORE YOU GOT THE MONEY TO BUY MORE: NEVER TRUE
HOW OFTEN DO YOU GET TOGETHER WITH FRIENDS OR RELATIVES?: ONCE A WEEK
HOW OFTEN DO YOU ATTENT MEETINGS OF THE CLUB OR ORGANIZATION YOU BELONG TO?: MORE THAN 4 TIMES PER YEAR
HOW OFTEN DO YOU ATTEND CHURCH OR RELIGIOUS SERVICES?: NEVER
DO YOU BELONG TO ANY CLUBS OR ORGANIZATIONS SUCH AS CHURCH GROUPS UNIONS, FRATERNAL OR ATHLETIC GROUPS, OR SCHOOL GROUPS?: YES
HOW MANY DRINKS CONTAINING ALCOHOL DO YOU HAVE ON A TYPICAL DAY WHEN YOU ARE DRINKING: 1 OR 2
HOW OFTEN DO YOU ATTEND CHURCH OR RELIGIOUS SERVICES?: NEVER
HOW OFTEN DO YOU GET TOGETHER WITH FRIENDS OR RELATIVES?: ONCE A WEEK
HOW OFTEN DO YOU ATTENT MEETINGS OF THE CLUB OR ORGANIZATION YOU BELONG TO?: MORE THAN 4 TIMES PER YEAR
IN A TYPICAL WEEK, HOW MANY TIMES DO YOU TALK ON THE PHONE WITH FAMILY, FRIENDS, OR NEIGHBORS?: THREE TIMES A WEEK
IN A TYPICAL WEEK, HOW MANY TIMES DO YOU TALK ON THE PHONE WITH FAMILY, FRIENDS, OR NEIGHBORS?: THREE TIMES A WEEK
DO YOU BELONG TO ANY CLUBS OR ORGANIZATIONS SUCH AS CHURCH GROUPS UNIONS, FRATERNAL OR ATHLETIC GROUPS, OR SCHOOL GROUPS?: YES

## 2024-03-02 ASSESSMENT — LIFESTYLE VARIABLES
AUDIT-C TOTAL SCORE: 4
HOW OFTEN DO YOU HAVE SIX OR MORE DRINKS ON ONE OCCASION: NEVER
SKIP TO QUESTIONS 9-10: 1
HOW MANY STANDARD DRINKS CONTAINING ALCOHOL DO YOU HAVE ON A TYPICAL DAY: 1 OR 2
HOW OFTEN DO YOU HAVE A DRINK CONTAINING ALCOHOL: 4 OR MORE TIMES A WEEK

## 2024-03-05 ENCOUNTER — APPOINTMENT (OUTPATIENT)
Dept: INTERNAL MEDICINE | Facility: OTHER | Age: 79
End: 2024-03-05
Payer: MEDICARE

## 2024-05-21 ENCOUNTER — APPOINTMENT (OUTPATIENT)
Dept: INTERNAL MEDICINE | Facility: OTHER | Age: 79
End: 2024-05-21
Payer: MEDICARE